# Patient Record
Sex: FEMALE | Race: BLACK OR AFRICAN AMERICAN | NOT HISPANIC OR LATINO | Employment: OTHER | URBAN - METROPOLITAN AREA
[De-identification: names, ages, dates, MRNs, and addresses within clinical notes are randomized per-mention and may not be internally consistent; named-entity substitution may affect disease eponyms.]

---

## 2023-05-02 ENCOUNTER — OFFICE VISIT (OUTPATIENT)
Dept: PRIMARY CARE | Facility: CLINIC | Age: 83
End: 2023-05-02
Payer: COMMERCIAL

## 2023-05-02 VITALS
HEIGHT: 65 IN | SYSTOLIC BLOOD PRESSURE: 120 MMHG | HEART RATE: 69 BPM | BODY MASS INDEX: 28.79 KG/M2 | WEIGHT: 172.8 LBS | OXYGEN SATURATION: 100 % | DIASTOLIC BLOOD PRESSURE: 79 MMHG

## 2023-05-02 DIAGNOSIS — H61.22 IMPACTED CERUMEN, LEFT EAR: ICD-10-CM

## 2023-05-02 DIAGNOSIS — R93.3 ABNORMAL COLONOSCOPY: Primary | ICD-10-CM

## 2023-05-02 PROCEDURE — 1036F TOBACCO NON-USER: CPT | Performed by: STUDENT IN AN ORGANIZED HEALTH CARE EDUCATION/TRAINING PROGRAM

## 2023-05-02 PROCEDURE — 99214 OFFICE O/P EST MOD 30 MIN: CPT | Performed by: STUDENT IN AN ORGANIZED HEALTH CARE EDUCATION/TRAINING PROGRAM

## 2023-05-02 PROCEDURE — 1159F MED LIST DOCD IN RCRD: CPT | Performed by: STUDENT IN AN ORGANIZED HEALTH CARE EDUCATION/TRAINING PROGRAM

## 2023-05-02 RX ORDER — METOPROLOL SUCCINATE 100 MG/1
100 TABLET, EXTENDED RELEASE ORAL DAILY
COMMUNITY

## 2023-05-02 RX ORDER — CHOLECALCIFEROL (VITAMIN D3) 25 MCG
1000 TABLET ORAL
COMMUNITY
Start: 2015-02-11

## 2023-05-02 RX ORDER — AMLODIPINE BESYLATE 5 MG/1
5 TABLET ORAL DAILY
COMMUNITY
End: 2023-10-16

## 2023-05-02 RX ORDER — IBUPROFEN 100 MG/5ML
1000 SUSPENSION, ORAL (FINAL DOSE FORM) ORAL
COMMUNITY

## 2023-05-02 NOTE — PROGRESS NOTES
"Subjective   Patient ID: Chio Lopez is a 82 y.o. female who presents for colonoscopy concerns (Pt had problems previously, wants to check again to make sure all is well.) and disability placard (Pr requests another one, she misplaced the previous one. ).  HPI  Patient here for colonoscopy order as it was recommended she has a repeat colonoscopy in 3 years when she had her last one performed in 2019 due to the polyps detected.    Also needs Rx for disability placard.    Denies new onset headaches, fever, chills, n/v/d, chest pain, SOB, abdominal pain, urinary symptoms, and lower extremity edema.     Review of Systems  All other systems have been reviewed and are negative.    Visit Vitals  /79   Pulse 69   Ht 1.651 m (5' 5\")   Wt 78.4 kg (172 lb 12.8 oz)   SpO2 100%   BMI 28.76 kg/m²   Smoking Status Never   BSA 1.9 m²       Objective   Physical Exam  General: Alert and oriented. Appears well-nourished and in no acute distress.  Eyes: PERRLA. EOMI.  Ears:  The pinna, tragus, and ear canal are non-tender and without swelling. The ear canal is clear without discharge. The tympanic membrane is normal in appearance with a good cone of light. Left impacted cerumen.  Head/neck: Normocephalic. Supple.  Lymphatics: No cervical lymphadenopathy.  Respiratory/Thorax: Clear to auscultation bilaterally. No wheezing.   Cardiovascular: Regular rate and rhythm. No murmurs.  Gastrointestinal: Soft, nontender, nondistended. +BS   Musculoskeletal: ROM intact. No joint swelling. Normal strength   Extremities: Warm and well perfused. No peripheral edema.  Neurological: No gross neurologic deficits.   Psychological: Appropriate mood and affect.   Skin: No visible rashes or lesions.     Assessment/Plan   Abnormal colonoscopy: colonoscopy order placed. Will follow up with results.  Arthritis of both knees: Disability placard provided for patient. Printed requisition for patient.  Left impacted cerumen: Rx Debrox sent to " pharmacy. Encouraged patient to return to clinic for irrigation, if necessary      No red flags. Follow up after her colonoscopy to discuss results.    Problem List Items Addressed This Visit    None      I have personally reviewed all available pertinent labs, imaging, and consult notes with the patient.     All questions and concerns were addressed. Patient verbalizes understanding instructions and agrees with established plan of care.     Leo Ugalde MD, MS  Family Medicine  [facility]

## 2023-09-18 PROBLEM — E78.5 HYPERLIPIDEMIA: Status: ACTIVE | Noted: 2023-09-18

## 2023-09-18 PROBLEM — Z86.010 HISTORY OF COLON POLYPS: Status: ACTIVE | Noted: 2023-09-18

## 2023-09-18 PROBLEM — R00.2 PALPITATIONS: Status: ACTIVE | Noted: 2023-09-18

## 2023-09-18 PROBLEM — I65.23 ATHEROSCLEROSIS OF BOTH CAROTID ARTERIES: Status: ACTIVE | Noted: 2023-09-18

## 2023-09-18 PROBLEM — E55.9 VITAMIN D DEFICIENCY: Status: ACTIVE | Noted: 2023-09-18

## 2023-09-18 PROBLEM — R29.898 WEAKNESS OF RIGHT LOWER EXTREMITY: Status: ACTIVE | Noted: 2023-09-18

## 2023-09-18 PROBLEM — R79.89 ABNORMAL CBC MEASUREMENT: Status: ACTIVE | Noted: 2023-09-18

## 2023-09-18 PROBLEM — R25.2 LEG CRAMPS: Status: ACTIVE | Noted: 2023-09-18

## 2023-09-18 PROBLEM — Z86.0100 HISTORY OF COLON POLYPS: Status: ACTIVE | Noted: 2023-09-18

## 2023-09-18 PROBLEM — R29.898 IMPAIRED FLEXIBILITY OF LOWER EXTREMITY: Status: ACTIVE | Noted: 2023-09-18

## 2023-09-18 PROBLEM — I47.29 NSVT (NONSUSTAINED VENTRICULAR TACHYCARDIA) (MULTI): Status: ACTIVE | Noted: 2023-09-18

## 2023-09-18 PROBLEM — K59.09 CHRONIC CONSTIPATION: Status: ACTIVE | Noted: 2023-09-18

## 2023-09-18 PROBLEM — I10 HYPERTENSION, GOAL BELOW 150/90: Status: ACTIVE | Noted: 2023-09-18

## 2023-09-18 PROBLEM — M17.0 OSTEOARTHRITIS OF KNEES, BILATERAL: Status: ACTIVE | Noted: 2023-09-18

## 2023-09-18 PROBLEM — I47.10 PAROXYSMAL SVT (SUPRAVENTRICULAR TACHYCARDIA) (CMS-HCC): Status: ACTIVE | Noted: 2023-09-18

## 2023-09-18 PROBLEM — R01.1 SYSTOLIC MURMUR: Status: ACTIVE | Noted: 2023-09-18

## 2023-09-21 ENCOUNTER — APPOINTMENT (OUTPATIENT)
Dept: PRIMARY CARE | Facility: CLINIC | Age: 83
End: 2023-09-21
Payer: COMMERCIAL

## 2023-11-17 ENCOUNTER — OFFICE VISIT (OUTPATIENT)
Dept: ORTHOPEDIC SURGERY | Facility: CLINIC | Age: 83
End: 2023-11-17
Payer: COMMERCIAL

## 2023-11-17 DIAGNOSIS — M17.0 PRIMARY OSTEOARTHRITIS OF BOTH KNEES: Primary | ICD-10-CM

## 2023-11-17 PROCEDURE — 20610 DRAIN/INJ JOINT/BURSA W/O US: CPT | Performed by: PHYSICIAN ASSISTANT

## 2023-11-17 PROCEDURE — 1036F TOBACCO NON-USER: CPT | Performed by: PHYSICIAN ASSISTANT

## 2023-11-17 PROCEDURE — 2500000004 HC RX 250 GENERAL PHARMACY W/ HCPCS (ALT 636 FOR OP/ED): Performed by: PHYSICIAN ASSISTANT

## 2023-11-17 PROCEDURE — 99214 OFFICE O/P EST MOD 30 MIN: CPT | Performed by: PHYSICIAN ASSISTANT

## 2023-11-17 PROCEDURE — 1159F MED LIST DOCD IN RCRD: CPT | Performed by: PHYSICIAN ASSISTANT

## 2023-11-17 PROCEDURE — 2500000005 HC RX 250 GENERAL PHARMACY W/O HCPCS: Performed by: PHYSICIAN ASSISTANT

## 2023-11-17 RX ORDER — LIDOCAINE HYDROCHLORIDE 10 MG/ML
4 INJECTION INFILTRATION; PERINEURAL
Status: COMPLETED | OUTPATIENT
Start: 2023-11-17 | End: 2023-11-17

## 2023-11-17 RX ORDER — TRIAMCINOLONE ACETONIDE 40 MG/ML
1 INJECTION, SUSPENSION INTRA-ARTICULAR; INTRAMUSCULAR
Status: COMPLETED | OUTPATIENT
Start: 2023-11-17 | End: 2023-11-17

## 2023-11-17 RX ADMIN — LIDOCAINE HYDROCHLORIDE 4 ML: 10 INJECTION, SOLUTION INFILTRATION; PERINEURAL at 09:42

## 2023-11-17 RX ADMIN — TRIAMCINOLONE ACETONIDE 1 ML: 400 INJECTION, SUSPENSION INTRA-ARTICULAR; INTRAMUSCULAR at 09:42

## 2023-11-17 NOTE — PROGRESS NOTES
GONZALO Jarquin, PAReinaC, ATC  Adult Reconstruction and Joint Replacement Surgery  Phone: [phone]     Fax:[phone]            No chief complaint on file.      SHAN Lopez is a pleasant 83 y.o. year-old female here for follow-up of known underlying Bilateral knee osteoarthritis. He/She presents for repeat evaluation.  The patient notes persistent pain as well as some occasional mechanical symptoms.  Pain level: 6. He/She is getting about 3 monthsof relief out of her injections.   The Pt has elected to undergo injection/s today.    The patient has tried  or is continuing the following modalities Rest, ice, elevation and Injections with some relief.    Past Medical History:   Diagnosis Date    Abnormal findings on diagnostic imaging of other parts of musculoskeletal system 05/01/2018    Abnormal bone density screening    Benign neoplasm of colon, unspecified     Colon adenoma    Diverticulosis of large intestine without perforation or abscess without bleeding     Diverticulosis, sigmoid    Elevated blood-pressure reading, without diagnosis of hypertension 10/24/2019    Finding of above normal blood pressure    Encounter for screening for malignant neoplasm of cervix 08/13/2020    Screening for cervical cancer    Encounter for screening for respiratory tuberculosis 05/01/2018    Screening-pulmonary TB    Encounter for screening, unspecified 08/13/2020    Screening for condition    Other chondrocalcinosis, left knee     Chondrocalcinosis of left knee    Other chondrocalcinosis, right knee     Chondrocalcinosis of right knee    Other long term (current) drug therapy     On statin therapy    Personal history of malignant neoplasm of breast     History of malignant neoplasm of female breast    Radiculopathy, thoracic region     Thoracic and lumbosacral neuritis     Patient Active Problem List   Diagnosis    Abnormal CBC measurement    Atherosclerosis of both carotid arteries    Benign  neoplasm of colon    Chronic constipation    History of colon polyps    Hyperlipidemia    Hypertension, goal below 150/90    Impaired flexibility of lower extremity    Intermittent lightheadedness    Leg cramps    Low back pain    NSVT (nonsustained ventricular tachycardia) (CMS/HCC)    Osteoarthritis of knees, bilateral    Pain in limb    Palpitations    Paroxysmal SVT (supraventricular tachycardia)    Personal history of malignant neoplasm of breast    Phlebitis    Pure hypercholesterolemia    Right calf pain    Systolic murmur    Weakness of right lower extremity    Vitamin D deficiency       Medication Documentation Review Audit       Reviewed by Ynes Patel MA (Medical Assistant) on 05/02/23 at 1356      Medication Order Taking? Sig Documenting Provider Last Dose Status   amLODIPine (Norvasc) 5 mg tablet [id number]  Take 1 tablet (5 mg) by mouth once daily. for blood pressure Historical Provider, MD  Active   ascorbic acid (Vitamin C) 1,000 mg tablet [id number]  Take 1 tablet (1,000 mg) by mouth once daily. Historical Provider, MD  Active   cholecalciferol (Vitamin D-3) 25 MCG (1000 UT) tablet [id number] Yes Take 1 tablet (1,000 Units) by mouth once daily. Historical Provider, MD  Active   metoprolol succinate XL (Toprol-XL) 100 mg 24 hr tablet [id number]  Take 1 tablet (100 mg) by mouth once daily. Historical Provider, MD  Active                    Allergies   Allergen Reactions    Penicillins Rash       Social History     Socioeconomic History    Marital status:      Spouse name: Not on file    Number of children: Not on file    Years of education: Not on file    Highest education level: Not on file   Occupational History    Not on file   Tobacco Use    Smoking status: Never    Smokeless tobacco: Never   Substance and Sexual Activity    Alcohol use: Yes     Comment: social    Drug use: Never    Sexual activity: Not on file   Other Topics Concern    Not on file   Social History Narrative    Not on  file     Social Determinants of Health     Financial Resource Strain: Not on file   Food Insecurity: Not on file   Transportation Needs: Not on file   Physical Activity: Not on file   Stress: Not on file   Social Connections: Not on file   Intimate Partner Violence: Not on file   Housing Stability: Not on file       No past surgical history on file.    Review of Systems    Physical Exam:  side: right Knee:  General: Well-appearing female in no acute distress.  Awake, alert and oriented.  Pleasant and cooperative.  Respiratory: Non-labored breathing  Mood: Euthymic   Gait: Antalgic  Assistive Device: no device  Skin: warm, dry and intact without lesions  Tenderness to palpation over anterior and medial, Joint line.  Effusion: mild  ROM Flexion/Extension: Unchanged  No instability varus or valgus stressing the knee at 0, 30 or 60 degrees.  No instability in the AP plane at 90 degrees.  5/5 hip flexion/knee extension/DF/PF/EHL  SILT in a candido/saph/per/tib distribution  Neuro L5-S1 sensation intact bilaterally, No clonus. 2+ symmetric patella and achilles reflexes bilateral.  2+ Femoral/DP/PT pulses bilaterally  Compartments supple and non-tender.  Extremities warm and well perfused.    There were no vitals filed for this visit.  There is no height or weight on file to calculate BMI.    Impression/Plan  Chio Lopez is getting adequate relief from injection therapy.     I discussed non-operative treatments for the patients' arthritis in detail and briefly discussed indications for surgery vs conservative treatment. The patient has elected to undergo knee side: bilateral injections today.  He understands he can repeat the injections every 3 to 4 months.  Hopefully gets the relief he is looking for.    Primary Osteoarthritis side: bilateral Knee    Procedure  L Inj/Asp: bilateral knee on 11/17/2023 9:42 AM  Indications: pain and joint swelling  Details: 22 G needle, anterolateral approach  Medications (Right): 1 mL  triamcinolone acetonide 40 mg/mL; 4 mL lidocaine 10 mg/mL (1 %)  Medications (Left): 1 mL triamcinolone acetonide 40 mg/mL; 4 mL lidocaine 10 mg/mL (1 %)    Discussion:  I discussed the conservative treatment options for knee osteoarthritis including but not limited to physical therapy, oral NSAIDS, activity and lifestyle modification, and corticosteroid injections. Pt has elected to undergo a cortisone injection today. I have explained the risk and benefits of an injection including the possibility of joint infection, bleeding, damage to cartilage, allergic reaction. Patient verbalized understanding and gave verbal consent wishes to proceed with a intra-articular cortisone injection for their knee.    Procedure:  After discussing the risk and benefits of the procedure, we proceeded with an intra-articular bilateral knee injection.    With the patient's informed verbal consent, the bilateral knees were prepped in standard sterile fashion with Chlorhexidine. The skin was then anesthetized with ethyl chloride spray and cleaned again with Chlorhexidine. The bilateral knees were then apirated/injected with a prefilled 20-gauge syringe of 40 mg Kenalog + 4 ml Lidocaine using the lateral approach without complications.  The patient tolerated this well and felt immediate initial relief of symptoms. A bandaid was applied and the patient ambulated out of the clinic on ther own accord without difficulty. Patient was instructed to avoid physical activity for 24-48 hours to prevent the knees from swelling and may ice the knees as tolerated. Patient should contact the office if any signs of of infection appear: redness, fever, chills, drainage, swelling or warmth to the knees.  Pt understands that the injections can be repeated no sooner than 3 months.      Procedure, treatment alternatives, risks and benefits explained, specific risks discussed. Consent was given by the patient. Immediately prior to procedure a time out was  called to verify the correct patient, procedure, equipment, support staff and site/side marked as required. Patient was prepped and draped in the usual sterile fashion.           All questions were answered.    Follow-up 3-4 months/PRN    GONZALO Jarquin, PAReinaC, ATC  Orthopedic Physician Assisant  Adult Reconstruction and Total Joint Replacement  General Orthopedics  Department of Orthopaedic Surgery  [place] [id number]  [employer] messaging preferred

## 2023-11-29 ENCOUNTER — OFFICE VISIT (OUTPATIENT)
Dept: CARDIOLOGY | Facility: CLINIC | Age: 83
End: 2023-11-29
Payer: COMMERCIAL

## 2023-11-29 VITALS
OXYGEN SATURATION: 97 % | HEIGHT: 65 IN | DIASTOLIC BLOOD PRESSURE: 78 MMHG | WEIGHT: 164 LBS | SYSTOLIC BLOOD PRESSURE: 122 MMHG | BODY MASS INDEX: 27.32 KG/M2 | HEART RATE: 70 BPM

## 2023-11-29 DIAGNOSIS — I47.10 PAROXYSMAL SVT (SUPRAVENTRICULAR TACHYCARDIA) (CMS-HCC): ICD-10-CM

## 2023-11-29 DIAGNOSIS — I10 HYPERTENSION, GOAL BELOW 150/90: ICD-10-CM

## 2023-11-29 DIAGNOSIS — R01.1 SYSTOLIC MURMUR: ICD-10-CM

## 2023-11-29 DIAGNOSIS — R00.2 PALPITATIONS: ICD-10-CM

## 2023-11-29 DIAGNOSIS — E78.00 PURE HYPERCHOLESTEROLEMIA: ICD-10-CM

## 2023-11-29 DIAGNOSIS — I47.29 NSVT (NONSUSTAINED VENTRICULAR TACHYCARDIA) (MULTI): ICD-10-CM

## 2023-11-29 DIAGNOSIS — I65.23 ATHEROSCLEROSIS OF BOTH CAROTID ARTERIES: Primary | ICD-10-CM

## 2023-11-29 PROCEDURE — 3078F DIAST BP <80 MM HG: CPT | Performed by: STUDENT IN AN ORGANIZED HEALTH CARE EDUCATION/TRAINING PROGRAM

## 2023-11-29 PROCEDURE — 1036F TOBACCO NON-USER: CPT | Performed by: STUDENT IN AN ORGANIZED HEALTH CARE EDUCATION/TRAINING PROGRAM

## 2023-11-29 PROCEDURE — 3074F SYST BP LT 130 MM HG: CPT | Performed by: STUDENT IN AN ORGANIZED HEALTH CARE EDUCATION/TRAINING PROGRAM

## 2023-11-29 PROCEDURE — 1159F MED LIST DOCD IN RCRD: CPT | Performed by: STUDENT IN AN ORGANIZED HEALTH CARE EDUCATION/TRAINING PROGRAM

## 2023-11-29 PROCEDURE — 99214 OFFICE O/P EST MOD 30 MIN: CPT | Performed by: STUDENT IN AN ORGANIZED HEALTH CARE EDUCATION/TRAINING PROGRAM

## 2023-11-29 RX ORDER — PRAVASTATIN SODIUM 40 MG/1
40 TABLET ORAL NIGHTLY
COMMUNITY
Start: 2023-11-27

## 2023-11-29 ASSESSMENT — ENCOUNTER SYMPTOMS
DEPRESSION: 0
OCCASIONAL FEELINGS OF UNSTEADINESS: 0
LOSS OF SENSATION IN FEET: 0

## 2023-11-29 NOTE — PROGRESS NOTES
Follow-up (1 year follow up)     HPI:    Chio Lopez is a 83 y.o. female with pertinent history ofhyperlipidemia, hypertension, palpitations with event monitor performed January 2021 revealing 362 episodes of supraventricular tachycardia lasting up to 33 beats and 12 episodes of nonsustained ventricular tachycardia lasting up to 28 beats, improvement on beta-blocker therapy, no clear ischemia nuclear stress test performed 3/1/2021, coronary calcium score of 4.8 performed 2/2/2021, preserved ejection fraction with impaired relaxation diastolic dysfunction mild to moderate tricuspid regurgitation and echo performed 3/1/2021 presents to cardiology clinic for follow-up.      Clinically she is doing well.  No significant chest discomfort.  Dyspnea on exertion is stable.  Reviewed and discussed her prior echo and stress test results.  Blood pressure has been well-controlled.  No exacerbating or relieving factors.  Patient denies chest pain and angina.  Pt denies orthopnea, and paroxysmal nocturnal dyspnea.  Pt denies worsening lower extremity edema.  Pt denies palpitations or syncope.  No recent falls.  No fever or chills.  No cough.  No change in bowel or bladder habits.  No sick contacts.  No recent travel.    12 point review of systems including (Constitutional, Eyes, ENMT, Respiratory, Cardiac, Gastrointestinal, Neurological, Psychiatric, and Hematologic) was performed and is otherwise negative.    Past medical history reviewed:   has a past medical history of Abnormal findings on diagnostic imaging of other parts of musculoskeletal system (05/01/2018), Benign neoplasm of colon, unspecified, Diverticulosis of large intestine without perforation or abscess without bleeding, Elevated blood-pressure reading, without diagnosis of hypertension (10/24/2019), Encounter for screening for malignant neoplasm of cervix (08/13/2020), Encounter for screening for respiratory tuberculosis (05/01/2018), Encounter for screening,  unspecified (08/13/2020), Other chondrocalcinosis, left knee, Other chondrocalcinosis, right knee, Other long term (current) drug therapy, Personal history of malignant neoplasm of breast, and Radiculopathy, thoracic region.    Past surgical history reviewed:   has no past surgical history on file.    Social history reviewed:   reports that she has never smoked. She has never used smokeless tobacco. She reports current alcohol use. She reports that she does not use drugs.     Family history reviewed:    Family History   Problem Relation Name Age of Onset    Diabetes Father      Other (cardiac disorder) Father         Allergies reviewed: Penicillins     Medications reviewed:   Current Outpatient Medications   Medication Instructions    amLODIPine (NORVASC) 5 mg, oral, Daily, for blood pressure    ascorbic acid (VITAMIN C) 1,000 mg, oral, Daily RT    cholecalciferol (VITAMIN D-3) 1,000 Units, oral, Daily RT    metoprolol succinate XL (TOPROL-XL) 100 mg, oral, Daily    pravastatin (PRAVACHOL) 40 mg, oral, Nightly        Vitals reviewed: Visit Vitals  /78   Pulse 70       Physical Exam:   General:  Patient is awake, alert, and oriented.  Patient is in no acute distress.  HEENT:  Pupils equal and reactive.  Normocephalic.  Moist mucosa.    Neck:  No thyromegaly.  Normal Jugular Venous Pressure.  Cardiovascular:  Regular rate and rhythm.  Normal S1 and S2.  1/6 FUAD.  Pulmonary:  Clear to auscultation bilaterally.  Abdomen:  Soft. Non-tender.   Non-distended.  Positive bowel sounds.  Lower Extremities:  2+ pedal pulses. No LE edema.  Neurologic:  Cranial nerves intact.  No focal deficit.   Skin: Skin warm and dry, normal skin turgor.   Psychiatric: Normal affect.    Last Labs:  CBC -      Lab Results   Component Value Date    WBC 3.8 (L) 09/22/2022    HGB 13.0 09/22/2022    HCT 37.6 09/22/2022     09/22/2022        CMP-  Lab Results   Component Value Date    GLUCOSE 112 (H) 09/22/2022     09/22/2022    K  4.4 09/22/2022     09/22/2022    CO2 32 09/22/2022    ANIONGAP 10 09/22/2022    BUN 16 09/22/2022    CREATININE 0.69 09/22/2022    CALCIUM 10.2 09/22/2022    PROT 7.3 09/22/2022    ALBUMIN 4.3 09/22/2022    AST 18 09/22/2022    ALT 9 09/22/2022    ALKPHOS 48 09/22/2022    BILITOT 0.4 09/22/2022        LIPIDS-  Lab Results   Component Value Date    CHOL 228 (H) 09/22/2022    TRIG 65 09/22/2022    HDL 76.5 09/22/2022    CHHDL 3.0 09/22/2022    VLDL 13 09/22/2022        OTHERS-  Lab Results   Component Value Date    HGBA1C 5.3 09/22/2022        I personally reviewed the patient's recent vitals, labs, medications, orders, EKGs, pertinent cardiac imaging/ echocardiography and ischemic evaluations including stress testing.    Assessment and Plan:    Chio Lopez is a 83 y.o. female with pertinent history ofhyperlipidemia, hypertension, palpitations with event monitor performed January 2021 revealing 362 episodes of supraventricular tachycardia lasting up to 33 beats and 12 episodes of nonsustained ventricular tachycardia lasting up to 28 beats, improvement on beta-blocker therapy, no clear ischemia nuclear stress test performed 3/1/2021, coronary calcium score of 4.8 performed 2/2/2021, preserved ejection fraction with impaired relaxation diastolic dysfunction mild to moderate tricuspid regurgitation and echo performed 3/1/2021 presents to cardiology clinic for follow-up.  Clinically she is doing well.  No significant chest discomfort.  Dyspnea on exertion is stable.  Reviewed and discussed her prior echo and stress test results.  Blood pressure has been well-controlled.     Continue amlodipine 5 mg daily, pravastatin 40 mg daily, metoprolol succinate 100 mg daily.     We will obtain a transthoracic echocardiogram for structural evaluation including ejection fraction, assessment of regional wall motion abnormalities or valvular disease, and further evaluation of hemodynamics.    Please followup with me in  Cardiology clinic within the next 1 year.  Please return to clinic sooner or seek emergent care if your symptoms reoccur or worsen.    Thank you for allowing me to participate in their care.  Please feel free to call me with any further questions or concerns.        Thony Elizabeth MD, FAC, [facility]ANDT  Division of Cardiovascular Medicine  Medical Director, [facility]  [facility]  Assistant Clinical Professor, Medicine  [employer]  [email or web address]  Office:  [phone]

## 2023-11-29 NOTE — PATIENT INSTRUCTIONS
Continue amlodipine 5 mg daily, pravastatin 40 mg daily, metoprolol succinate 100 mg daily.     We will obtain a transthoracic echocardiogram for structural evaluation including ejection fraction, assessment of regional wall motion abnormalities or valvular disease, and further evaluation of hemodynamics.    Please followup with me in Cardiology clinic within the next 1 year.  Please return to clinic sooner or seek emergent care if your symptoms reoccur or worsen.

## 2024-02-16 ENCOUNTER — OFFICE VISIT (OUTPATIENT)
Dept: ORTHOPEDIC SURGERY | Facility: CLINIC | Age: 84
End: 2024-02-16
Payer: COMMERCIAL

## 2024-02-16 VITALS — BODY MASS INDEX: 27.32 KG/M2 | HEIGHT: 65 IN | WEIGHT: 164 LBS

## 2024-02-16 DIAGNOSIS — M17.0 PRIMARY OSTEOARTHRITIS OF BOTH KNEES: Primary | ICD-10-CM

## 2024-02-16 PROCEDURE — 1125F AMNT PAIN NOTED PAIN PRSNT: CPT | Performed by: PHYSICIAN ASSISTANT

## 2024-02-16 PROCEDURE — 2500000004 HC RX 250 GENERAL PHARMACY W/ HCPCS (ALT 636 FOR OP/ED): Performed by: PHYSICIAN ASSISTANT

## 2024-02-16 PROCEDURE — 99214 OFFICE O/P EST MOD 30 MIN: CPT | Performed by: PHYSICIAN ASSISTANT

## 2024-02-16 PROCEDURE — 2500000005 HC RX 250 GENERAL PHARMACY W/O HCPCS: Performed by: PHYSICIAN ASSISTANT

## 2024-02-16 PROCEDURE — 20610 DRAIN/INJ JOINT/BURSA W/O US: CPT | Performed by: PHYSICIAN ASSISTANT

## 2024-02-16 PROCEDURE — 1159F MED LIST DOCD IN RCRD: CPT | Performed by: PHYSICIAN ASSISTANT

## 2024-02-16 PROCEDURE — 1036F TOBACCO NON-USER: CPT | Performed by: PHYSICIAN ASSISTANT

## 2024-02-16 RX ORDER — TRIAMCINOLONE ACETONIDE 40 MG/ML
1 INJECTION, SUSPENSION INTRA-ARTICULAR; INTRAMUSCULAR
Status: COMPLETED | OUTPATIENT
Start: 2024-02-16 | End: 2024-02-16

## 2024-02-16 RX ORDER — LIDOCAINE HYDROCHLORIDE 10 MG/ML
4 INJECTION INFILTRATION; PERINEURAL
Status: COMPLETED | OUTPATIENT
Start: 2024-02-16 | End: 2024-02-16

## 2024-02-16 RX ADMIN — LIDOCAINE HYDROCHLORIDE 4 ML: 10 INJECTION, SOLUTION INFILTRATION; PERINEURAL at 09:26

## 2024-02-16 RX ADMIN — TRIAMCINOLONE ACETONIDE 1 ML: 40 INJECTION, SUSPENSION INTRA-ARTICULAR; INTRAMUSCULAR at 09:26

## 2024-02-16 ASSESSMENT — PAIN DESCRIPTION - DESCRIPTORS: DESCRIPTORS: NAGGING

## 2024-02-16 ASSESSMENT — PAIN SCALES - GENERAL: PAINLEVEL_OUTOF10: 8

## 2024-02-16 ASSESSMENT — PAIN - FUNCTIONAL ASSESSMENT: PAIN_FUNCTIONAL_ASSESSMENT: 0-10

## 2024-02-16 NOTE — PROGRESS NOTES
Rohini Dubois, GONZALO, PAReinaC, ATC  Adult Reconstruction and Joint Replacement Surgery  Phone: [phone]     Fax:[phone]            Chief Complaint   Patient presents with    Left Knee - Injection Follow-up     Bilateral knee injections       HPI  Chio Lopez is a pleasant 83 y.o. year-old female here for follow-up of known underlying side: bilateral knee osteoarthritis. He/She presents for repeat evaluation.  The patient notes persistent pain as well as some occasional mechanical symptoms.  Pain level: 7. He/She is getting about 3 monthsof relief out of her injections.   The Pt has elected to undergo injection/s today.    The patient has tried  or is continuing the following modalities Injections with some relief.    Past Medical History:   Diagnosis Date    Abnormal findings on diagnostic imaging of other parts of musculoskeletal system 05/01/2018    Abnormal bone density screening    Benign neoplasm of colon, unspecified     Colon adenoma    Diverticulosis of large intestine without perforation or abscess without bleeding     Diverticulosis, sigmoid    Elevated blood-pressure reading, without diagnosis of hypertension 10/24/2019    Finding of above normal blood pressure    Encounter for screening for malignant neoplasm of cervix 08/13/2020    Screening for cervical cancer    Encounter for screening for respiratory tuberculosis 05/01/2018    Screening-pulmonary TB    Encounter for screening, unspecified 08/13/2020    Screening for condition    Other chondrocalcinosis, left knee     Chondrocalcinosis of left knee    Other chondrocalcinosis, right knee     Chondrocalcinosis of right knee    Other long term (current) drug therapy     On statin therapy    Personal history of malignant neoplasm of breast     History of malignant neoplasm of female breast    Radiculopathy, thoracic region     Thoracic and lumbosacral neuritis     Patient Active Problem List   Diagnosis    Abnormal CBC measurement     Atherosclerosis of both carotid arteries    Benign neoplasm of colon    Chronic constipation    History of colon polyps    Hyperlipidemia    Hypertension, goal below 150/90    Impaired flexibility of lower extremity    Intermittent lightheadedness    Leg cramps    Low back pain    NSVT (nonsustained ventricular tachycardia) (CMS/HCC)    Osteoarthritis of knees, bilateral    Pain in limb    Palpitations    Paroxysmal SVT (supraventricular tachycardia)    Personal history of malignant neoplasm of breast    Phlebitis    Pure hypercholesterolemia    Right calf pain    Systolic murmur    Weakness of right lower extremity    Vitamin D deficiency       Medication Documentation Review Audit       Reviewed by Lisa Craven MA (Medical Assistant) on 02/16/24 at 0909      Medication Order Taking? Sig Documenting Provider Last Dose Status   amLODIPine (Norvasc) 5 mg tablet [id number] No TAKE 1 TABLET BY MOUTH DAILY FOR BLOOD PRESSURE Daysi Harding, APRN-CNP Taking Active   ascorbic acid (Vitamin C) 1,000 mg tablet [id number] No Take 1 tablet (1,000 mg) by mouth once daily. Historical Provider, MD Taking Active   cholecalciferol (Vitamin D-3) 25 MCG (1000 UT) tablet [id number] No Take 1 tablet (1,000 Units) by mouth once daily. Historical Provider, MD Taking Active   metoprolol succinate XL (Toprol-XL) 100 mg 24 hr tablet [id number] No Take 1 tablet (100 mg) by mouth once daily. Historical Provider, MD Taking Active   pravastatin (Pravachol) 40 mg tablet [id number] No Take 1 tablet (40 mg) by mouth once daily at bedtime. Historical Provider, MD Taking Active                    Allergies   Allergen Reactions    Penicillins Rash       Social History     Socioeconomic History    Marital status:      Spouse name: Not on file    Number of children: Not on file    Years of education: Not on file    Highest education level: Not on file   Occupational History    Not on file   Tobacco Use    Smoking status: Never    Smokeless tobacco:  Never   Substance and Sexual Activity    Alcohol use: Yes     Comment: social    Drug use: Never    Sexual activity: Not on file   Other Topics Concern    Not on file   Social History Narrative    Not on file     Social Determinants of Health     Financial Resource Strain: Not on file   Food Insecurity: Not on file   Transportation Needs: Not on file   Physical Activity: Not on file   Stress: Not on file   Social Connections: Not on file   Intimate Partner Violence: Not on file   Housing Stability: Not on file       No past surgical history on file.    Review of Systems    Physical Exam:  side: bilateral Knee:  General: Well-appearing female in no acute distress.  Awake, alert and oriented.  Pleasant and cooperative.  Respiratory: Non-labored breathing  Mood: Euthymic   Gait: Antalgic  Assistive Device: no device  Skin: warm, dry and intact without lesions  Tenderness to palpation over anterior and medial, Joint line.  Effusion: mild  ROM Flexion/Extension: Unchanged  No instability varus or valgus stressing the knee at 0, 30 or 60 degrees.  No instability in the AP plane at 90 degrees.  5/5 hip flexion/knee extension/DF/PF/EHL  SILT in a candido/saph/per/tib distribution  Neuro L5-S1 sensation intact bilaterally, No clonus. 2+ symmetric patella and achilles reflexes bilateral.  2+ Femoral/DP/PT pulses bilaterally  Compartments supple and non-tender.  Extremities warm and well perfused.    There were no vitals filed for this visit.  Body mass index is 27.29 kg/m².    Impression/Plan  Chio Lopez is getting adequate relief from injection therapy.     I discussed non-operative treatments for the patients' arthritis in detail and briefly discussed indications for surgery vs conservative treatment. The patient has elected to undergo knee side: bilateral injections today.  He understands he can repeat the injections every 3 to 4 months.  Hopefully gets the relief he is looking for.    Primary Osteoarthritis side:  bilateral Knee    Procedure  L Inj/Asp: bilateral knee on 2/16/2024 9:26 AM  Indications: pain and joint swelling  Details: 22 G needle, anterolateral approach  Medications (Right): 1 mL triamcinolone acetonide 40 mg/mL; 4 mL lidocaine 10 mg/mL (1 %)  Medications (Left): 1 mL triamcinolone acetonide 40 mg/mL; 4 mL lidocaine 10 mg/mL (1 %)    Discussion:  I discussed the conservative treatment options for knee osteoarthritis including but not limited to physical therapy, oral NSAIDS, activity and lifestyle modification, and corticosteroid injections. Pt has elected to undergo a cortisone injection today. I have explained the risk and benefits of an injection including the possibility of joint infection, bleeding, damage to cartilage, allergic reaction. Patient verbalized understanding and gave verbal consent wishes to proceed with a intra-articular cortisone injection for their knee.    Procedure:  After discussing the risk and benefits of the procedure, we proceeded with an intra-articular bilateral knee injection.    With the patient's informed verbal consent, the bilateral knees were prepped in standard sterile fashion with Chlorhexidine. The skin was then anesthetized with ethyl chloride spray and cleaned again with Chlorhexidine. The bilateral knees were then apirated/injected with a prefilled 20-gauge syringe of 40 mg Kenalog + 4 ml Lidocaine using the lateral approach without complications.  The patient tolerated this well and felt immediate initial relief of symptoms. A bandaid was applied and the patient ambulated out of the clinic on ther own accord without difficulty. Patient was instructed to avoid physical activity for 24-48 hours to prevent the knees from swelling and may ice the knees as tolerated. Patient should contact the office if any signs of of infection appear: redness, fever, chills, drainage, swelling or warmth to the knees.  Pt understands that the injections can be repeated no sooner than 3  months.      Procedure, treatment alternatives, risks and benefits explained, specific risks discussed. Consent was given by the patient. Immediately prior to procedure a time out was called to verify the correct patient, procedure, equipment, support staff and site/side marked as required. Patient was prepped and draped in the usual sterile fashion.           All questions were answered.    Follow-up 3-4 months/PRN    GONZALO Jarquin, PAReinaC, ATC  Orthopedic Physician Assisant  Adult Reconstruction and Total Joint Replacement  General Orthopedics  Department of Orthopaedic Surgery  [place] [id number]  [employer] messaging preferred

## 2024-05-17 ENCOUNTER — OFFICE VISIT (OUTPATIENT)
Dept: ORTHOPEDIC SURGERY | Facility: CLINIC | Age: 84
End: 2024-05-17
Payer: COMMERCIAL

## 2024-05-17 VITALS — HEIGHT: 65 IN | WEIGHT: 164 LBS | BODY MASS INDEX: 27.32 KG/M2

## 2024-05-17 DIAGNOSIS — M17.0 PRIMARY OSTEOARTHRITIS OF KNEES, BILATERAL: Primary | ICD-10-CM

## 2024-05-17 PROCEDURE — 20610 DRAIN/INJ JOINT/BURSA W/O US: CPT | Performed by: PHYSICIAN ASSISTANT

## 2024-05-17 PROCEDURE — 1159F MED LIST DOCD IN RCRD: CPT | Performed by: PHYSICIAN ASSISTANT

## 2024-05-17 PROCEDURE — 99214 OFFICE O/P EST MOD 30 MIN: CPT | Performed by: PHYSICIAN ASSISTANT

## 2024-05-17 PROCEDURE — 2500000004 HC RX 250 GENERAL PHARMACY W/ HCPCS (ALT 636 FOR OP/ED): Performed by: PHYSICIAN ASSISTANT

## 2024-05-17 PROCEDURE — 1036F TOBACCO NON-USER: CPT | Performed by: PHYSICIAN ASSISTANT

## 2024-05-17 PROCEDURE — 2500000005 HC RX 250 GENERAL PHARMACY W/O HCPCS: Performed by: PHYSICIAN ASSISTANT

## 2024-05-17 RX ORDER — LIDOCAINE HYDROCHLORIDE 10 MG/ML
4 INJECTION INFILTRATION; PERINEURAL
Status: COMPLETED | OUTPATIENT
Start: 2024-05-17 | End: 2024-05-17

## 2024-05-17 RX ORDER — TRIAMCINOLONE ACETONIDE 40 MG/ML
1 INJECTION, SUSPENSION INTRA-ARTICULAR; INTRAMUSCULAR
Status: COMPLETED | OUTPATIENT
Start: 2024-05-17 | End: 2024-05-17

## 2024-05-17 RX ADMIN — TRIAMCINOLONE ACETONIDE 1 ML: 400 INJECTION, SUSPENSION INTRA-ARTICULAR; INTRAMUSCULAR at 09:30

## 2024-05-17 RX ADMIN — LIDOCAINE HYDROCHLORIDE 4 ML: 10 INJECTION, SOLUTION INFILTRATION; PERINEURAL at 09:30

## 2024-05-17 ASSESSMENT — PAIN DESCRIPTION - DESCRIPTORS: DESCRIPTORS: ACHING;DISCOMFORT;DULL

## 2024-05-17 ASSESSMENT — PAIN - FUNCTIONAL ASSESSMENT: PAIN_FUNCTIONAL_ASSESSMENT: 0-10

## 2024-05-17 ASSESSMENT — PAIN SCALES - GENERAL: PAINLEVEL_OUTOF10: 5 - MODERATE PAIN

## 2024-05-17 NOTE — PROGRESS NOTES
GONZALO Jarquin, PAReinaC, ATC  Adult Reconstruction and Joint Replacement Surgery  Phone: [phone]     Fax:[phone]            Chief Complaint   Patient presents with    Left Knee - Pain    Right Knee - Pain       HPI  Chio Lopez is a pleasant 83 y.o. year-old female here for follow-up of known underlying side: bilateral knee osteoarthritis. He/She presents for repeat evaluation.  The patient notes persistent pain as well as some occasional mechanical symptoms.  Pain level: 5. He/She is getting about 3 monthsof relief out of her injections.   The Pt has elected to undergo injection/s today.    Past Medical History:   Diagnosis Date    Abnormal findings on diagnostic imaging of other parts of musculoskeletal system 05/01/2018    Abnormal bone density screening    Benign neoplasm of colon, unspecified     Colon adenoma    Diverticulosis of large intestine without perforation or abscess without bleeding     Diverticulosis, sigmoid    Elevated blood-pressure reading, without diagnosis of hypertension 10/24/2019    Finding of above normal blood pressure    Encounter for screening for malignant neoplasm of cervix 08/13/2020    Screening for cervical cancer    Encounter for screening for respiratory tuberculosis 05/01/2018    Screening-pulmonary TB    Encounter for screening, unspecified 08/13/2020    Screening for condition    Other chondrocalcinosis, left knee     Chondrocalcinosis of left knee    Other chondrocalcinosis, right knee     Chondrocalcinosis of right knee    Other long term (current) drug therapy     On statin therapy    Personal history of malignant neoplasm of breast     History of malignant neoplasm of female breast    Radiculopathy, thoracic region     Thoracic and lumbosacral neuritis     Patient Active Problem List   Diagnosis    Abnormal CBC measurement    Atherosclerosis of both carotid arteries    Benign neoplasm of colon    Chronic constipation    History of colon polyps     Hyperlipidemia    Hypertension, goal below 150/90    Impaired flexibility of lower extremity    Intermittent lightheadedness    Leg cramps    Low back pain    NSVT (nonsustained ventricular tachycardia) (Multi)    Osteoarthritis of knees, bilateral    Pain in limb    Palpitations    Paroxysmal SVT (supraventricular tachycardia) (CMS-HCC)    Personal history of malignant neoplasm of breast    Phlebitis    Pure hypercholesterolemia    Right calf pain    Systolic murmur    Weakness of right lower extremity    Vitamin D deficiency       Medication Documentation Review Audit       Reviewed by Daisy Mccarthy, PCNA (Patient Care Technician) on 05/17/24 at 0850      Medication Order Taking? Sig Documenting Provider Last Dose Status   amLODIPine (Norvasc) 5 mg tablet [id number] Yes TAKE 1 TABLET BY MOUTH DAILY FOR BLOOD PRESSURE Daysi Harding, APRN-CNP Taking Active   ascorbic acid (Vitamin C) 1,000 mg tablet [id number] Yes Take 1 tablet (1,000 mg) by mouth once daily. Historical Provider, MD Taking Active   cholecalciferol (Vitamin D-3) 25 MCG (1000 UT) tablet [id number] Yes Take 1 tablet (1,000 Units) by mouth once daily. Historical Provider, MD Taking Active   metoprolol succinate XL (Toprol-XL) 100 mg 24 hr tablet [id number] Yes Take 1 tablet (100 mg) by mouth once daily. Historical Provider, MD Taking Active   pravastatin (Pravachol) 40 mg tablet [id number] Yes Take 1 tablet (40 mg) by mouth once daily at bedtime. Historical Provider, MD Taking Active                    Allergies   Allergen Reactions    Penicillins Rash       Social History     Socioeconomic History    Marital status:      Spouse name: Not on file    Number of children: Not on file    Years of education: Not on file    Highest education level: Not on file   Occupational History    Not on file   Tobacco Use    Smoking status: Never    Smokeless tobacco: Never   Substance and Sexual Activity    Alcohol use: Yes     Comment: social    Drug use:  Never    Sexual activity: Not on file   Other Topics Concern    Not on file   Social History Narrative    Not on file     Social Determinants of Health     Financial Resource Strain: Not on file   Food Insecurity: Not on file   Transportation Needs: Not on file   Physical Activity: Not on file   Stress: Not on file   Social Connections: Not on file   Intimate Partner Violence: Not on file   Housing Stability: Not on file       History reviewed. No pertinent surgical history.    Review of Systems    Physical Exam:  side: bilateral Knee:  General: Well-appearing female in no acute distress.  Awake, alert and oriented.  Pleasant and cooperative.  Respiratory: Non-labored breathing  Mood: Euthymic   Gait: Antalgic  Assistive Device: cane  Skin: warm, dry and intact without lesions  Tenderness to palpation over anterior and medial, Joint line.  Effusion: mild  ROM Flexion/Extension: Unchanged  No instability varus or valgus stressing the knee at 0, 30 or 60 degrees.  No instability in the AP plane at 90 degrees.  5/5 hip flexion/knee extension/DF/PF/EHL  SILT in a candido/saph/per/tib distribution  Neuro L5-S1 sensation intact bilaterally, No clonus. 2+ symmetric patella and achilles reflexes bilateral.  2+ Femoral/DP/PT pulses bilaterally  Compartments supple and non-tender.  Extremities warm and well perfused.    There were no vitals filed for this visit.  Body mass index is 27.29 kg/m².    Impression/Plan  Chio Lopez is getting adequate relief from injection therapy.     I discussed non-operative treatments for the patients' arthritis in detail and briefly discussed indications for surgery vs conservative treatment. The patient has elected to undergo knee side: bilateral injections today.  He understands he can repeat the injections every 3 to 4 months.  Hopefully gets the relief he is looking for.    Primary Osteoarthritis side: bilateral Knee    Procedure  L Inj/Asp: bilateral knee on 5/17/2024 9:30  AM  Indications: pain and joint swelling  Details: 22 G needle, anterolateral approach  Medications (Right): 1 mL triamcinolone acetonide 40 mg/mL; 4 mL lidocaine 10 mg/mL (1 %)  Medications (Left): 1 mL triamcinolone acetonide 40 mg/mL; 4 mL lidocaine 10 mg/mL (1 %)    Discussion:  I discussed the conservative treatment options for knee osteoarthritis including but not limited to physical therapy, oral NSAIDS, activity and lifestyle modification, and corticosteroid injections. Pt has elected to undergo a cortisone injection today. I have explained the risk and benefits of an injection including the possibility of joint infection, bleeding, damage to cartilage, allergic reaction. Patient verbalized understanding and gave verbal consent wishes to proceed with a intra-articular cortisone injection for their knee.    Procedure:  After discussing the risk and benefits of the procedure, we proceeded with an intra-articular bilateral knee injection.    With the patient's informed verbal consent, the bilateral knees were prepped in standard sterile fashion with Chlorhexidine. The skin was then anesthetized with ethyl chloride spray and cleaned again with Chlorhexidine. The bilateral knees were then apirated/injected with a prefilled 20-gauge syringe of 40 mg Kenalog + 4 ml Lidocaine using the lateral approach without complications.  The patient tolerated this well and felt immediate initial relief of symptoms. A bandaid was applied and the patient ambulated out of the clinic on ther own accord without difficulty. Patient was instructed to avoid physical activity for 24-48 hours to prevent the knees from swelling and may ice the knees as tolerated. Patient should contact the office if any signs of of infection appear: redness, fever, chills, drainage, swelling or warmth to the knees.  Pt understands that the injections can be repeated no sooner than 3 months.      Procedure, treatment alternatives, risks and benefits  explained, specific risks discussed. Consent was given by the patient. Immediately prior to procedure a time out was called to verify the correct patient, procedure, equipment, support staff and site/side marked as required. Patient was prepped and draped in the usual sterile fashion.           All questions were answered.    Follow-up 3-4 months/PRN    GONZALO Jarquin, PAReinaC, ATC  Orthopedic Physician Assisant  Adult Reconstruction and Total Joint Replacement  General Orthopedics  Department of Orthopaedic Surgery  [place] [address]  [employer] messaging preferred

## 2024-08-16 ENCOUNTER — HOSPITAL ENCOUNTER (OUTPATIENT)
Dept: RADIOLOGY | Facility: CLINIC | Age: 84
Discharge: HOME | End: 2024-08-16
Payer: COMMERCIAL

## 2024-08-16 ENCOUNTER — APPOINTMENT (OUTPATIENT)
Dept: RADIOLOGY | Facility: CLINIC | Age: 84
End: 2024-08-16
Payer: COMMERCIAL

## 2024-08-16 ENCOUNTER — OFFICE VISIT (OUTPATIENT)
Dept: ORTHOPEDIC SURGERY | Facility: CLINIC | Age: 84
End: 2024-08-16
Payer: COMMERCIAL

## 2024-08-16 VITALS — WEIGHT: 162 LBS | BODY MASS INDEX: 26.96 KG/M2

## 2024-08-16 DIAGNOSIS — M25.562 ACUTE BILATERAL KNEE PAIN: ICD-10-CM

## 2024-08-16 DIAGNOSIS — M25.561 ACUTE BILATERAL KNEE PAIN: ICD-10-CM

## 2024-08-16 DIAGNOSIS — M15.9 PRIMARY OSTEOARTHRITIS INVOLVING MULTIPLE JOINTS: ICD-10-CM

## 2024-08-16 DIAGNOSIS — M17.0 BILATERAL PRIMARY OSTEOARTHRITIS OF KNEE: Primary | ICD-10-CM

## 2024-08-16 PROCEDURE — 2500000005 HC RX 250 GENERAL PHARMACY W/O HCPCS: Performed by: PHYSICIAN ASSISTANT

## 2024-08-16 PROCEDURE — 20610 DRAIN/INJ JOINT/BURSA W/O US: CPT | Mod: 50 | Performed by: PHYSICIAN ASSISTANT

## 2024-08-16 PROCEDURE — 1159F MED LIST DOCD IN RCRD: CPT | Performed by: PHYSICIAN ASSISTANT

## 2024-08-16 PROCEDURE — 1125F AMNT PAIN NOTED PAIN PRSNT: CPT | Performed by: PHYSICIAN ASSISTANT

## 2024-08-16 PROCEDURE — 73562 X-RAY EXAM OF KNEE 3: CPT | Mod: RT

## 2024-08-16 PROCEDURE — 2500000004 HC RX 250 GENERAL PHARMACY W/ HCPCS (ALT 636 FOR OP/ED): Performed by: PHYSICIAN ASSISTANT

## 2024-08-16 PROCEDURE — 99214 OFFICE O/P EST MOD 30 MIN: CPT | Performed by: PHYSICIAN ASSISTANT

## 2024-08-16 PROCEDURE — 73560 X-RAY EXAM OF KNEE 1 OR 2: CPT | Mod: LT

## 2024-08-16 RX ORDER — LIDOCAINE HYDROCHLORIDE 10 MG/ML
4 INJECTION INFILTRATION; PERINEURAL
Status: COMPLETED | OUTPATIENT
Start: 2024-08-16 | End: 2024-08-16

## 2024-08-16 RX ORDER — TRIAMCINOLONE ACETONIDE 40 MG/ML
1 INJECTION, SUSPENSION INTRA-ARTICULAR; INTRAMUSCULAR
Status: COMPLETED | OUTPATIENT
Start: 2024-08-16 | End: 2024-08-16

## 2024-08-16 ASSESSMENT — PAIN SCALES - GENERAL: PAINLEVEL: 7

## 2024-08-16 NOTE — PROGRESS NOTES
Rohini Dubois, GONZALO, PAReinaC, ATC  Adult Reconstruction and Joint Replacement Surgery  Phone: [phone]     Fax:[phone]            Chief Complaint   Patient presents with    Left Knee - Pain    Right Knee - Pain    Injection Follow-up       HPI  Chio Lopez is a pleasant 84 y.o. year-old female here for follow-up of known underlying side: bilateral knee osteoarthritis. He/She presents for repeat evaluation.  The patient notes persistent pain as well as some occasional mechanical symptoms.  Pain level: 4. He/She is getting about 2 monthsof relief out of her injections.   The Pt has elected to undergo injection/s today.  She has not had x-rays in 4 years.    Past Medical History:   Diagnosis Date    Abnormal findings on diagnostic imaging of other parts of musculoskeletal system 05/01/2018    Abnormal bone density screening    Benign neoplasm of colon, unspecified     Colon adenoma    Diverticulosis of large intestine without perforation or abscess without bleeding     Diverticulosis, sigmoid    Elevated blood-pressure reading, without diagnosis of hypertension 10/24/2019    Finding of above normal blood pressure    Encounter for screening for malignant neoplasm of cervix 08/13/2020    Screening for cervical cancer    Encounter for screening for respiratory tuberculosis 05/01/2018    Screening-pulmonary TB    Encounter for screening, unspecified 08/13/2020    Screening for condition    Other chondrocalcinosis, left knee     Chondrocalcinosis of left knee    Other chondrocalcinosis, right knee     Chondrocalcinosis of right knee    Other long term (current) drug therapy     On statin therapy    Personal history of malignant neoplasm of breast     History of malignant neoplasm of female breast    Radiculopathy, thoracic region     Thoracic and lumbosacral neuritis     Patient Active Problem List   Diagnosis    Abnormal CBC measurement    Atherosclerosis of both carotid arteries    Benign neoplasm  of colon    Chronic constipation    History of colon polyps    Hyperlipidemia    Hypertension, goal below 150/90    Impaired flexibility of lower extremity    Intermittent lightheadedness    Leg cramps    Low back pain    NSVT (nonsustained ventricular tachycardia) (Multi)    Osteoarthritis of knees, bilateral    Pain in limb    Palpitations    Paroxysmal SVT (supraventricular tachycardia) (CMS-HCC)    Personal history of malignant neoplasm of breast    Phlebitis    Pure hypercholesterolemia    Right calf pain    Systolic murmur    Weakness of right lower extremity    Vitamin D deficiency       Medication Documentation Review Audit       Reviewed by Ras Rivera PCNA (Patient Care Technician) on 08/16/24 at 0906      Medication Order Taking? Sig Documenting Provider Last Dose Status   amLODIPine (Norvasc) 5 mg tablet [id number] No TAKE 1 TABLET BY MOUTH DAILY FOR BLOOD PRESSURE Daysi Harding, APRN-CNP Taking Active   ascorbic acid (Vitamin C) 1,000 mg tablet [id number] No Take 1 tablet (1,000 mg) by mouth once daily. Historical Provider, MD Taking Active   cholecalciferol (Vitamin D-3) 25 MCG (1000 UT) tablet [id number] No Take 1 tablet (1,000 Units) by mouth once daily. Historical Provider, MD Taking Active   metoprolol succinate XL (Toprol-XL) 100 mg 24 hr tablet [id number] No Take 1 tablet (100 mg) by mouth once daily. Historical Provider, MD Taking Active   pravastatin (Pravachol) 40 mg tablet [id number] No Take 1 tablet (40 mg) by mouth once daily at bedtime. Historical Provider, MD Taking Active                    Allergies   Allergen Reactions    Penicillins Rash       Social History     Socioeconomic History    Marital status:      Spouse name: Not on file    Number of children: Not on file    Years of education: Not on file    Highest education level: Not on file   Occupational History    Not on file   Tobacco Use    Smoking status: Never    Smokeless tobacco: Never   Substance and Sexual Activity     Alcohol use: Yes     Comment: social    Drug use: Never    Sexual activity: Not on file   Other Topics Concern    Not on file   Social History Narrative    Not on file     Social Determinants of Health     Financial Resource Strain: Not on file   Food Insecurity: Not on file   Transportation Needs: Not on file   Physical Activity: Not on file   Stress: Not on file   Social Connections: Not on file   Intimate Partner Violence: Not on file   Housing Stability: Not on file       No past surgical history on file.    Review of Systems    Physical Exam:  side: bilateral Knee:  General: Well-appearing female in no acute distress.  Awake, alert and oriented.  Pleasant and cooperative.  Respiratory: Non-labored breathing  Mood: Euthymic   Gait: Antalgic  Assistive Device: no device  Skin: warm, dry and intact without lesions  Tenderness to palpation over anterior and medial, Joint line.  Effusion: mild  ROM Flexion/Extension: Unchanged  No instability varus or valgus stressing the knee at 0, 30 or 60 degrees.  No instability in the AP plane at 90 degrees.  5/5 hip flexion/knee extension/DF/PF/EHL  SILT in a candido/saph/per/tib distribution  Neuro L5-S1 sensation intact bilaterally, No clonus. 2+ symmetric patella and achilles reflexes bilateral.  2+ Femoral/DP/PT pulses bilaterally  Compartments supple and non-tender.  Extremities warm and well perfused.    There were no vitals filed for this visit.  Body mass index is 26.96 kg/m².    Impression/Plan  Chio Lopez is getting adequate relief from injection therapy.     I discussed non-operative treatments for the patients' arthritis in detail and briefly discussed indications for surgery vs conservative treatment. The patient has elected to undergo knee side: bilateral injections today.  He understands he can repeat the injections every 3 to 4 months.  Hopefully gets the relief he is looking for.    Will plan to get x-rays on her way out today.  I will call her with the  results.    Primary Osteoarthritis side: bilateral Knee    Procedure  L Inj/Asp: bilateral knee on 8/16/2024 9:20 AM  Indications: pain and joint swelling  Details: 22 G needle, anterolateral approach  Medications (Right): 1 mL triamcinolone acetonide 40 mg/mL; 4 mL lidocaine 10 mg/mL (1 %)  Medications (Left): 1 mL triamcinolone acetonide 40 mg/mL; 4 mL lidocaine 10 mg/mL (1 %)  Outcome: tolerated well, no immediate complications    Discussion:  I discussed the conservative treatment options for knee osteoarthritis including but not limited to physical therapy, oral NSAIDS, activity and lifestyle modification, and corticosteroid injections. Pt has elected to undergo a cortisone injection today. I have explained the risk and benefits of an injection including the possibility of joint infection, bleeding, damage to cartilage, allergic reaction. Patient verbalized understanding and gave verbal consent wishes to proceed with a intra-articular cortisone injection for their knee.    Procedure:  After discussing the risk and benefits of the procedure, we proceeded with an intra-articular bilateral knee injection.    With the patient's informed verbal consent, the bilateral knees were prepped in standard sterile fashion with Chlorhexidine. The skin was then anesthetized with ethyl chloride spray and cleaned again with Chlorhexidine. The bilateral knees were then apirated/injected with a prefilled 20-gauge syringe of 40 mg Kenalog + 4 ml Lidocaine using the lateral approach without complications.  The patient tolerated this well and felt immediate initial relief of symptoms. A bandaid was applied and the patient ambulated out of the clinic on ther own accord without difficulty. Patient was instructed to avoid physical activity for 24-48 hours to prevent the knees from swelling and may ice the knees as tolerated. Patient should contact the office if any signs of of infection appear: redness, fever, chills, drainage, swelling  or warmth to the knees.  Pt understands that the injections can be repeated no sooner than 3 months.      Procedure, treatment alternatives, risks and benefits explained, specific risks discussed. Consent was given by the patient. Immediately prior to procedure a time out was called to verify the correct patient, procedure, equipment, support staff and site/side marked as required. Patient was prepped and draped in the usual sterile fashion.           All questions were answered.    Follow-up 3-4 months/PRN    GONZALO Jarquin, PA-C, ATC  Orthopedic Physician Assisant  Adult Reconstruction and Total Joint Replacement  General Orthopedics  Department of Orthopaedic Surgery  [place] [address]  [employer] messaging preferred

## 2024-11-15 ENCOUNTER — ANCILLARY PROCEDURE (OUTPATIENT)
Dept: CARDIOLOGY | Facility: CLINIC | Age: 84
End: 2024-11-15
Payer: COMMERCIAL

## 2024-11-15 DIAGNOSIS — I65.23 ATHEROSCLEROSIS OF BOTH CAROTID ARTERIES: ICD-10-CM

## 2024-11-15 DIAGNOSIS — I10 HYPERTENSION, GOAL BELOW 150/90: ICD-10-CM

## 2024-11-15 DIAGNOSIS — E78.00 PURE HYPERCHOLESTEROLEMIA: ICD-10-CM

## 2024-11-15 DIAGNOSIS — I47.29 NSVT (NONSUSTAINED VENTRICULAR TACHYCARDIA) (MULTI): ICD-10-CM

## 2024-11-15 DIAGNOSIS — R01.1 SYSTOLIC MURMUR: ICD-10-CM

## 2024-11-15 DIAGNOSIS — R00.2 PALPITATIONS: ICD-10-CM

## 2024-11-15 DIAGNOSIS — I47.10 PAROXYSMAL SVT (SUPRAVENTRICULAR TACHYCARDIA) (CMS-HCC): ICD-10-CM

## 2024-11-15 LAB
AORTIC VALVE MEAN GRADIENT: 8 MMHG
AORTIC VALVE PEAK VELOCITY: 2.03 M/S
AV PEAK GRADIENT: 16 MMHG
AVA (PEAK VEL): 1.67 CM2
AVA (VTI): 1.84 CM2
EJECTION FRACTION APICAL 4 CHAMBER: 58.8
EJECTION FRACTION: 57 %
LEFT ATRIUM VOLUME AREA LENGTH INDEX BSA: 35 ML/M2
LEFT VENTRICLE INTERNAL DIMENSION DIASTOLE: 3.17 CM (ref 3.5–6)
LEFT VENTRICULAR OUTFLOW TRACT DIAMETER: 1.93 CM
MITRAL VALVE E/A RATIO: 0.9
RIGHT VENTRICLE FREE WALL PEAK S': 14 CM/S
RIGHT VENTRICLE PEAK SYSTOLIC PRESSURE: 33.9 MMHG
TRICUSPID ANNULAR PLANE SYSTOLIC EXCURSION: 2 CM

## 2024-11-15 PROCEDURE — 93306 TTE W/DOPPLER COMPLETE: CPT

## 2024-11-15 PROCEDURE — 93306 TTE W/DOPPLER COMPLETE: CPT | Performed by: STUDENT IN AN ORGANIZED HEALTH CARE EDUCATION/TRAINING PROGRAM

## 2024-11-21 ENCOUNTER — APPOINTMENT (OUTPATIENT)
Dept: ORTHOPEDIC SURGERY | Facility: CLINIC | Age: 84
End: 2024-11-21
Payer: COMMERCIAL

## 2024-11-21 DIAGNOSIS — M17.0 PRIMARY OSTEOARTHRITIS OF KNEES, BILATERAL: Primary | ICD-10-CM

## 2024-11-21 PROCEDURE — 99214 OFFICE O/P EST MOD 30 MIN: CPT | Performed by: PHYSICIAN ASSISTANT

## 2024-11-21 PROCEDURE — 2500000004 HC RX 250 GENERAL PHARMACY W/ HCPCS (ALT 636 FOR OP/ED): Performed by: PHYSICIAN ASSISTANT

## 2024-11-21 PROCEDURE — 20610 DRAIN/INJ JOINT/BURSA W/O US: CPT | Mod: 50 | Performed by: PHYSICIAN ASSISTANT

## 2024-11-21 RX ORDER — LIDOCAINE HYDROCHLORIDE 10 MG/ML
4 INJECTION, SOLUTION INFILTRATION; PERINEURAL
Status: COMPLETED | OUTPATIENT
Start: 2024-11-21 | End: 2024-11-21

## 2024-11-21 RX ORDER — TRIAMCINOLONE ACETONIDE 40 MG/ML
1 INJECTION, SUSPENSION INTRA-ARTICULAR; INTRAMUSCULAR
Status: COMPLETED | OUTPATIENT
Start: 2024-11-21 | End: 2024-11-21

## 2024-11-21 NOTE — PROGRESS NOTES
GONZALO Jarquin, PAReinaC, ATC  Adult Reconstruction and Joint Replacement Surgery  Phone: [phone]     Fax:[phone]            No chief complaint on file.      SHAN Lopez is a pleasant 84 y.o. year-old female here for follow-up of known underlying side: bilateral knee osteoarthritis. The patient presents for repeat evaluation.  The patient notes persistent pain as well as some occasional mechanical symptoms.  Pain level: 5. The patient is getting about 3 monthsof relief out of her injections.   The Pt has elected to undergo injection/s today.    Past Medical History:   Diagnosis Date    Abnormal findings on diagnostic imaging of other parts of musculoskeletal system 05/01/2018    Abnormal bone density screening    Benign neoplasm of colon, unspecified     Colon adenoma    Diverticulosis of large intestine without perforation or abscess without bleeding     Diverticulosis, sigmoid    Elevated blood-pressure reading, without diagnosis of hypertension 10/24/2019    Finding of above normal blood pressure    Encounter for screening for malignant neoplasm of cervix 08/13/2020    Screening for cervical cancer    Encounter for screening for respiratory tuberculosis 05/01/2018    Screening-pulmonary TB    Encounter for screening, unspecified 08/13/2020    Screening for condition    Other chondrocalcinosis, left knee     Chondrocalcinosis of left knee    Other chondrocalcinosis, right knee     Chondrocalcinosis of right knee    Other long term (current) drug therapy     On statin therapy    Personal history of malignant neoplasm of breast     History of malignant neoplasm of female breast    Radiculopathy, thoracic region     Thoracic and lumbosacral neuritis     Patient Active Problem List   Diagnosis    Abnormal CBC measurement    Atherosclerosis of both carotid arteries    Benign neoplasm of colon    Chronic constipation    History of colon polyps    Hyperlipidemia    Hypertension, goal  below 150/90    Impaired flexibility of lower extremity    Intermittent lightheadedness    Leg cramps    Low back pain    NSVT (nonsustained ventricular tachycardia) (Multi)    Osteoarthritis of knees, bilateral    Pain in limb    Palpitations    Paroxysmal SVT (supraventricular tachycardia) (CMS-HCC)    Personal history of malignant neoplasm of breast    Phlebitis    Pure hypercholesterolemia    Right calf pain    Systolic murmur    Weakness of right lower extremity    Vitamin D deficiency       Medication Documentation Review Audit       Reviewed by Ras Rivera, PCNA (Patient Care Technician) on 08/16/24 at 0906      Medication Order Taking? Sig Documenting Provider Last Dose Status   amLODIPine (Norvasc) 5 mg tablet [id number] No TAKE 1 TABLET BY MOUTH DAILY FOR BLOOD PRESSURE Daysi Harding, APRN-CNP Taking Active   ascorbic acid (Vitamin C) 1,000 mg tablet [id number] No Take 1 tablet (1,000 mg) by mouth once daily. Historical Provider, MD Taking Active   cholecalciferol (Vitamin D-3) 25 MCG (1000 UT) tablet [id number] No Take 1 tablet (1,000 Units) by mouth once daily. Historical Provider, MD Taking Active   metoprolol succinate XL (Toprol-XL) 100 mg 24 hr tablet [id number] No Take 1 tablet (100 mg) by mouth once daily. Historical Provider, MD Taking Active   pravastatin (Pravachol) 40 mg tablet [id number] No Take 1 tablet (40 mg) by mouth once daily at bedtime. Historical Provider, MD Taking Active                    Allergies   Allergen Reactions    Penicillins Rash       Social History     Socioeconomic History    Marital status:      Spouse name: Not on file    Number of children: Not on file    Years of education: Not on file    Highest education level: Not on file   Occupational History    Not on file   Tobacco Use    Smoking status: Never    Smokeless tobacco: Never   Substance and Sexual Activity    Alcohol use: Yes     Comment: social    Drug use: Never    Sexual activity: Not on file   Other  Topics Concern    Not on file   Social History Narrative    Not on file     Social Drivers of Health     Financial Resource Strain: Not on file   Food Insecurity: Not on file   Transportation Needs: Not on file   Physical Activity: Not on file   Stress: Not on file   Social Connections: Not on file   Intimate Partner Violence: Not on file   Housing Stability: Not on file       No past surgical history on file.    Review of Systems    Physical Exam:  side: bilateral Knee:  General: Well-appearing female in no acute distress.  Awake, alert and oriented.  Pleasant and cooperative.  Respiratory: Non-labored breathing  Mood: Euthymic   Gait: Antalgic  Assistive Device: walker  Skin: warm, dry and intact without lesions  Tenderness to palpation over anterior and medial, Joint line.  Effusion: mild  ROM Flexion/Extension: Unchanged  No instability varus or valgus stressing the knee at 0, 30 or 60 degrees.  No instability in the AP plane at 90 degrees.  5/5 hip flexion/knee extension/DF/PF/EHL  SILT in a candido/saph/per/tib distribution  Neuro L5-S1 sensation intact bilaterally, No clonus. 2+ symmetric patella and achilles reflexes bilateral.  2+ Femoral/DP/PT pulses bilaterally  Compartments supple and non-tender.  Extremities warm and well perfused.    There were no vitals filed for this visit.  There is no height or weight on file to calculate BMI.    Impression/Plan  Chio Lopez is getting adequate relief from injection therapy.     I discussed non-operative treatments for the patients' arthritis in detail and briefly discussed indications for surgery vs conservative treatment. The patient has elected to undergo knee side: bilateral injections today.  He understands he can repeat the injections every 3 to 4 months.  Hopefully gets the relief he is looking for.    Primary Osteoarthritis side: bilateral Knee    Procedure  L Inj/Asp: bilateral knee on 11/21/2024 9:15 AM  Indications: pain and joint swelling  Details: 22  G needle, anterolateral approach  Medications (Right): 1 mL triamcinolone acetonide 40 mg/mL; 4 mL lidocaine 10 mg/mL (1 %)  Medications (Left): 1 mL triamcinolone acetonide 40 mg/mL; 4 mL lidocaine 10 mg/mL (1 %)  Outcome: tolerated well, no immediate complications    Discussion:  I discussed the conservative treatment options for knee osteoarthritis including but not limited to physical therapy, oral NSAIDS, activity and lifestyle modification, and corticosteroid injections. Pt has elected to undergo a cortisone injection today. I have explained the risk and benefits of an injection including the possibility of joint infection, bleeding, damage to cartilage, allergic reaction. Patient verbalized understanding and gave verbal consent wishes to proceed with a intra-articular cortisone injection for their knee.    Procedure:  After discussing the risk and benefits of the procedure, we proceeded with an intra-articular bilateral knee injection.    With the patient's informed verbal consent, the bilateral knees were prepped in standard sterile fashion with Chlorhexidine. The skin was then anesthetized with ethyl chloride spray and cleaned again with Chlorhexidine. The bilateral knees were then apirated/injected with a prefilled 20-gauge syringe of 40 mg Kenalog + 4 ml Lidocaine using the lateral approach without complications.  The patient tolerated this well and felt immediate initial relief of symptoms. A bandaid was applied and the patient ambulated out of the clinic on ther own accord without difficulty. Patient was instructed to avoid physical activity for 24-48 hours to prevent the knees from swelling and may ice the knees as tolerated. Patient should contact the office if any signs of of infection appear: redness, fever, chills, drainage, swelling or warmth to the knees.  Pt understands that the injections can be repeated no sooner than 3 months.      Procedure, treatment alternatives, risks and benefits  explained, specific risks discussed. Consent was given by the patient. Immediately prior to procedure a time out was called to verify the correct patient, procedure, equipment, support staff and site/side marked as required. Patient was prepped and draped in the usual sterile fashion.           All questions were answered.    Follow-up 3-4 months/PRN    GONZALO Jarquin, PAReinaC, ATC  Orthopedic Physician Assisant  Adult Reconstruction and Total Joint Replacement  General Orthopedics  Department of Orthopaedic Surgery  [place] [address]  [employer] messaging preferred

## 2024-11-29 ENCOUNTER — APPOINTMENT (OUTPATIENT)
Dept: CARDIOLOGY | Facility: CLINIC | Age: 84
End: 2024-11-29
Payer: COMMERCIAL

## 2024-12-02 ENCOUNTER — OFFICE VISIT (OUTPATIENT)
Dept: CARDIOLOGY | Facility: CLINIC | Age: 84
End: 2024-12-02
Payer: COMMERCIAL

## 2024-12-02 VITALS
HEART RATE: 65 BPM | SYSTOLIC BLOOD PRESSURE: 138 MMHG | WEIGHT: 160 LBS | OXYGEN SATURATION: 100 % | BODY MASS INDEX: 26.66 KG/M2 | DIASTOLIC BLOOD PRESSURE: 72 MMHG | HEIGHT: 65 IN

## 2024-12-02 DIAGNOSIS — I47.29 NSVT (NONSUSTAINED VENTRICULAR TACHYCARDIA) (MULTI): ICD-10-CM

## 2024-12-02 DIAGNOSIS — I35.0 MILD AORTIC STENOSIS: ICD-10-CM

## 2024-12-02 DIAGNOSIS — R01.1 SYSTOLIC MURMUR: ICD-10-CM

## 2024-12-02 DIAGNOSIS — I65.23 ATHEROSCLEROSIS OF BOTH CAROTID ARTERIES: Primary | ICD-10-CM

## 2024-12-02 DIAGNOSIS — I51.7 LEFT VENTRICULAR HYPERTROPHY: ICD-10-CM

## 2024-12-02 DIAGNOSIS — R00.2 PALPITATIONS: ICD-10-CM

## 2024-12-02 DIAGNOSIS — E78.00 PURE HYPERCHOLESTEROLEMIA: ICD-10-CM

## 2024-12-02 DIAGNOSIS — I47.10 PAROXYSMAL SVT (SUPRAVENTRICULAR TACHYCARDIA) (CMS-HCC): ICD-10-CM

## 2024-12-02 DIAGNOSIS — I10 HYPERTENSION, GOAL BELOW 150/90: ICD-10-CM

## 2024-12-02 PROCEDURE — 3078F DIAST BP <80 MM HG: CPT | Performed by: STUDENT IN AN ORGANIZED HEALTH CARE EDUCATION/TRAINING PROGRAM

## 2024-12-02 PROCEDURE — 1159F MED LIST DOCD IN RCRD: CPT | Performed by: STUDENT IN AN ORGANIZED HEALTH CARE EDUCATION/TRAINING PROGRAM

## 2024-12-02 PROCEDURE — G2211 COMPLEX E/M VISIT ADD ON: HCPCS | Performed by: STUDENT IN AN ORGANIZED HEALTH CARE EDUCATION/TRAINING PROGRAM

## 2024-12-02 PROCEDURE — 3075F SYST BP GE 130 - 139MM HG: CPT | Performed by: STUDENT IN AN ORGANIZED HEALTH CARE EDUCATION/TRAINING PROGRAM

## 2024-12-02 PROCEDURE — 1036F TOBACCO NON-USER: CPT | Performed by: STUDENT IN AN ORGANIZED HEALTH CARE EDUCATION/TRAINING PROGRAM

## 2024-12-02 PROCEDURE — 99214 OFFICE O/P EST MOD 30 MIN: CPT | Performed by: STUDENT IN AN ORGANIZED HEALTH CARE EDUCATION/TRAINING PROGRAM

## 2024-12-02 NOTE — PROGRESS NOTES
Follow-up visit    HPI:    Chio Lopez is a 84 y.o. female with pertinent history of hyperlipidemia, hypertension, osteoarthritis, palpitations with event monitor performed January 2021 revealing 362 episodes of supraventricular tachycardia lasting up to 33 beats and 12 episodes of nonsustained ventricular tachycardia lasting up to 28 beats, improvement on beta-blocker therapy, no clear ischemia nuclear stress test performed 3/1/2021, coronary calcium score of 4.8 performed 2/2/2021, preserved ejection fraction of 57% with impaired relaxation diastolic dysfunction, septal hypertrophy, mild to moderate left atrial enlargement, mild aortic stenosis on echo performed 11/15/2024 presents to cardiology clinic for follow-up.      She is accompanied by her daughter provides history and has questions.  Clinically she is doing well.  She does have some achiness from osteoarthritis.  No significant chest discomfort.  Dyspnea on exertion is stable.  We reviewed and discussed her prior echo and stress test results.  We discussed the natural history of aortic stenosis and left ventricular hypertrophy.  Blood pressure has been acceptable but frequently running in the mid to high 130s systolic.  At this point, she is only taking half of a tablet of amlodipine 5 mg or a total of 2.5 mg daily.  No exacerbating or relieving factors.  Patient denies chest pain and angina.  Pt denies orthopnea, and paroxysmal nocturnal dyspnea.  Pt denies worsening lower extremity edema.  Pt denies palpitations or syncope.  No recent falls.  No fever or chills.  No cough.  No change in bowel or bladder habits.  No sick contacts.  No recent travel.    12 point review of systems including (Constitutional, Eyes, ENMT, Respiratory, Cardiac, Gastrointestinal, Neurological, Psychiatric, and Hematologic) was performed and is otherwise negative.    Past medical history reviewed:   has a past medical history of Abnormal findings on diagnostic imaging of  other parts of musculoskeletal system (05/01/2018), Benign neoplasm of colon, unspecified, Diverticulosis of large intestine without perforation or abscess without bleeding, Elevated blood-pressure reading, without diagnosis of hypertension (10/24/2019), Encounter for screening for malignant neoplasm of cervix (08/13/2020), Encounter for screening for respiratory tuberculosis (05/01/2018), Encounter for screening, unspecified (08/13/2020), Other chondrocalcinosis, left knee, Other chondrocalcinosis, right knee, Other long term (current) drug therapy, Personal history of malignant neoplasm of breast, and Radiculopathy, thoracic region.    Past surgical history reviewed:   has no past surgical history on file.    Social history reviewed:   reports that she has never smoked. She has never used smokeless tobacco. She reports current alcohol use. She reports that she does not use drugs.     Family history reviewed:    Family History   Problem Relation Name Age of Onset    Diabetes Father      Other (cardiac disorder) Father         Allergies reviewed: Penicillins     Medications reviewed:   Current Outpatient Medications   Medication Instructions    amLODIPine (NORVASC) 5 mg, oral, Daily, for blood pressure    ascorbic acid (VITAMIN C) 1,000 mg, oral, Daily RT    cholecalciferol (VITAMIN D-3) 1,000 Units, oral, Daily RT    metoprolol succinate XL (TOPROL-XL) 100 mg, oral, Daily    pravastatin (PRAVACHOL) 40 mg, oral, Nightly        Vitals reviewed: Visit Vitals  /72   Pulse 65         Physical Exam:   General:  Patient is awake, alert, and oriented.  Patient is in no acute distress.  HEENT:  Pupils equal and reactive.  Normocephalic.  Moist mucosa.    Neck:  No thyromegaly.  Normal Jugular Venous Pressure.  Cardiovascular:  Regular rate and rhythm.  Normal S1 and S2.  1/6 FUAD.  Pulmonary:  Clear to auscultation bilaterally.  Abdomen:  Soft. Non-tender.   Non-distended.  Positive bowel sounds.  Lower Extremities:   2+ pedal pulses. No LE edema.  Neurologic:  Cranial nerves intact.  No focal deficit.   Skin: Skin warm and dry, normal skin turgor.   Psychiatric: Normal affect.    Last Labs:  CBC -      Lab Results   Component Value Date    WBC 3.8 (L) 09/22/2022    HGB 13.0 09/22/2022    HCT 37.6 09/22/2022     09/22/2022        CMP-  Lab Results   Component Value Date    GLUCOSE 112 (H) 09/22/2022     09/22/2022    K 4.4 09/22/2022     09/22/2022    CO2 32 09/22/2022    ANIONGAP 10 09/22/2022    BUN 16 09/22/2022    CREATININE 0.69 09/22/2022    CALCIUM 10.2 09/22/2022    PROT 7.3 09/22/2022    ALBUMIN 4.3 09/22/2022    AST 18 09/22/2022    ALT 9 09/22/2022    ALKPHOS 48 09/22/2022    BILITOT 0.4 09/22/2022        LIPIDS-  Lab Results   Component Value Date    CHOL 228 (H) 09/22/2022    TRIG 65 09/22/2022    HDL 76.5 09/22/2022    CHHDL 3.0 09/22/2022    VLDL 13 09/22/2022        OTHERS-  Lab Results   Component Value Date    HGBA1C 5.3 09/22/2022        I personally reviewed the patient's recent vitals, labs, medications, orders, EKGs, pertinent cardiac imaging/ echocardiography and ischemic evaluations including stress testing.    Assessment and Plan:    Chio Lopez is a 84 y.o. female with pertinent history of hyperlipidemia, hypertension, osteoarthritis, palpitations with event monitor performed January 2021 revealing 362 episodes of supraventricular tachycardia lasting up to 33 beats and 12 episodes of nonsustained ventricular tachycardia lasting up to 28 beats, improvement on beta-blocker therapy, no clear ischemia nuclear stress test performed 3/1/2021, coronary calcium score of 4.8 performed 2/2/2021, preserved ejection fraction of 57% with impaired relaxation diastolic dysfunction, septal hypertrophy, mild to moderate left atrial enlargement, mild aortic stenosis on echo performed 11/15/2024 presents to cardiology clinic for follow-up.  She is accompanied by her daughter provides history and  has questions.  Clinically she is doing well.  She does have some achiness from osteoarthritis.  No significant chest discomfort.  Dyspnea on exertion is stable.  We reviewed and discussed her prior echo and stress test results.  We discussed the natural history of aortic stenosis and left ventricular hypertrophy.  Blood pressure has been acceptable but frequently running in the mid to high 130s systolic.  At this point, she is only taking half of a tablet of amlodipine 5 mg or a total of 2.5 mg daily.     We discussed changing amlodipine to half of a tablet or 2.5 mg twice daily as opposed to once daily or you could take the full tablet all at once if that works.  Hopefully this will result in better blood pressure control    Continue amlodipine 2.5 mg twice daily, pravastatin 40 mg daily, metoprolol succinate 100 mg daily.     Please followup with me in Cardiology clinic within the next 1 year.  Please return to clinic sooner or seek emergent care if your symptoms reoccur or worsen.    Thank you for allowing me to participate in their care.  Please feel free to call me with any further questions or concerns.      Thony Elizabeth MD, [facility], [facility]  Division of Cardiovascular Medicine  System Director, Nuclear Cardiology   Medical Director, [facility] [place], [facility]   Clinical , [employer]  [email or web address]   Office:  [phone]

## 2024-12-02 NOTE — PATIENT INSTRUCTIONS
We discussed changing amlodipine to half of a tablet or 2.5 mg twice daily as opposed to once daily or you could take the full tablet all at once if that works.  Hopefully this will result in better blood pressure control    Continue amlodipine 2.5 mg twice daily, pravastatin 40 mg daily, metoprolol succinate 100 mg daily.     Please followup with me in Cardiology clinic within the next 1 year.  Please return to clinic sooner or seek emergent care if your symptoms reoccur or worsen.

## 2025-02-21 ENCOUNTER — OFFICE VISIT (OUTPATIENT)
Dept: ORTHOPEDIC SURGERY | Facility: CLINIC | Age: 85
End: 2025-02-21
Payer: COMMERCIAL

## 2025-02-21 DIAGNOSIS — M17.0 PRIMARY OSTEOARTHRITIS OF KNEES, BILATERAL: Primary | ICD-10-CM

## 2025-02-21 PROCEDURE — 2500000004 HC RX 250 GENERAL PHARMACY W/ HCPCS (ALT 636 FOR OP/ED): Performed by: PHYSICIAN ASSISTANT

## 2025-02-21 PROCEDURE — 99214 OFFICE O/P EST MOD 30 MIN: CPT | Performed by: PHYSICIAN ASSISTANT

## 2025-02-21 PROCEDURE — 20610 DRAIN/INJ JOINT/BURSA W/O US: CPT | Mod: 50 | Performed by: PHYSICIAN ASSISTANT

## 2025-02-21 RX ORDER — LIDOCAINE HYDROCHLORIDE 10 MG/ML
4 INJECTION, SOLUTION INFILTRATION; PERINEURAL
Status: COMPLETED | OUTPATIENT
Start: 2025-02-21 | End: 2025-02-21

## 2025-02-21 RX ORDER — TRIAMCINOLONE ACETONIDE 40 MG/ML
1 INJECTION, SUSPENSION INTRA-ARTICULAR; INTRAMUSCULAR
Status: COMPLETED | OUTPATIENT
Start: 2025-02-21 | End: 2025-02-21

## 2025-02-21 RX ADMIN — LIDOCAINE HYDROCHLORIDE 4 ML: 10 INJECTION, SOLUTION INFILTRATION; PERINEURAL at 09:49

## 2025-02-21 RX ADMIN — TRIAMCINOLONE ACETONIDE 1 ML: 400 INJECTION, SUSPENSION INTRA-ARTICULAR; INTRAMUSCULAR at 09:49

## 2025-02-21 ASSESSMENT — PAIN DESCRIPTION - DESCRIPTORS: DESCRIPTORS: ACHING;DISCOMFORT

## 2025-02-21 ASSESSMENT — PAIN SCALES - GENERAL: PAINLEVEL_OUTOF10: 6

## 2025-02-21 ASSESSMENT — PAIN - FUNCTIONAL ASSESSMENT: PAIN_FUNCTIONAL_ASSESSMENT: 0-10

## 2025-02-21 NOTE — PROGRESS NOTES
GONZALO Jarquin, PAReinaC, ATC  Adult Reconstruction and Joint Replacement Surgery  Phone: [phone]     Fax:[phone]            Chief Complaint   Patient presents with    Left Knee - Pain    Right Knee - Pain       HPI  Chio Lopez is a pleasant 84 y.o. year-old female here for follow-up of known underlying side: bilateral knee osteoarthritis. The patient presents for repeat evaluation.  The patient notes persistent pain as well as some occasional mechanical symptoms.  Pain level: 5. The patient is getting about 3 monthsof relief out of her injections.   The Pt has elected to undergo injection/s today.    Past Medical History:   Diagnosis Date    Abnormal findings on diagnostic imaging of other parts of musculoskeletal system 05/01/2018    Abnormal bone density screening    Benign neoplasm of colon, unspecified     Colon adenoma    Diverticulosis of large intestine without perforation or abscess without bleeding     Diverticulosis, sigmoid    Elevated blood-pressure reading, without diagnosis of hypertension 10/24/2019    Finding of above normal blood pressure    Encounter for screening for malignant neoplasm of cervix 08/13/2020    Screening for cervical cancer    Encounter for screening for respiratory tuberculosis 05/01/2018    Screening-pulmonary TB    Encounter for screening, unspecified 08/13/2020    Screening for condition    Other chondrocalcinosis, left knee     Chondrocalcinosis of left knee    Other chondrocalcinosis, right knee     Chondrocalcinosis of right knee    Other long term (current) drug therapy     On statin therapy    Personal history of malignant neoplasm of breast     History of malignant neoplasm of female breast    Radiculopathy, thoracic region     Thoracic and lumbosacral neuritis     Patient Active Problem List   Diagnosis    Abnormal CBC measurement    Atherosclerosis of both carotid arteries    Benign neoplasm of colon    Chronic constipation    History of  colon polyps    Hyperlipidemia    Hypertension, goal below 150/90    Impaired flexibility of lower extremity    Intermittent lightheadedness    Leg cramps    Low back pain    NSVT (nonsustained ventricular tachycardia) (Multi)    Osteoarthritis of knees, bilateral    Pain in limb    Palpitations    Paroxysmal SVT (supraventricular tachycardia) (CMS-HCC)    Personal history of malignant neoplasm of breast    Phlebitis    Pure hypercholesterolemia    Right calf pain    Systolic murmur    Weakness of right lower extremity    Vitamin D deficiency       Medication Documentation Review Audit       Reviewed by Katherine Dowd on 02/21/25 at 0931      Medication Order Taking? Sig Documenting Provider Last Dose Status   amLODIPine (Norvasc) 5 mg tablet [id number] No TAKE 1 TABLET BY MOUTH DAILY FOR BLOOD PRESSURE Daysi Harding, APRN-CNP Taking Active   ascorbic acid (Vitamin C) 1,000 mg tablet [id number] No Take 1 tablet (1,000 mg) by mouth once daily. Historical Provider, MD Taking Active   cholecalciferol (Vitamin D-3) 25 MCG (1000 UT) tablet [id number] No Take 1 tablet (1,000 Units) by mouth once daily. Historical Provider, MD Taking Active   metoprolol succinate XL (Toprol-XL) 100 mg 24 hr tablet [id number] No Take 1 tablet (100 mg) by mouth once daily. Historical Provider, MD Taking Active   pravastatin (Pravachol) 40 mg tablet [id number] No Take 1 tablet (40 mg) by mouth once daily at bedtime. Historical Provider, MD Taking Active                    Allergies   Allergen Reactions    Penicillins Rash       Social History     Socioeconomic History    Marital status:      Spouse name: Not on file    Number of children: Not on file    Years of education: Not on file    Highest education level: Not on file   Occupational History    Not on file   Tobacco Use    Smoking status: Never    Smokeless tobacco: Never   Substance and Sexual Activity    Alcohol use: Yes     Comment: social    Drug use: Never    Sexual activity: Not on  file   Other Topics Concern    Not on file   Social History Narrative    Not on file     Social Drivers of Health     Financial Resource Strain: Not on file   Food Insecurity: Not on file   Transportation Needs: Not on file   Physical Activity: Not on file   Stress: Not on file   Social Connections: Not on file   Intimate Partner Violence: Not on file   Housing Stability: Not on file       History reviewed. No pertinent surgical history.    Review of Systems    Physical Exam:  side: bilateral Knee:  General: Well-appearing female in no acute distress.  Awake, alert and oriented.  Pleasant and cooperative.  Respiratory: Non-labored breathing  Mood: Euthymic   Gait: Antalgic  Assistive Device: no device  Skin: warm, dry and intact without lesions  Tenderness to palpation over anterior and lateral, Joint line.  Effusion: mild  ROM Flexion/Extension: Unchanged  No instability varus or valgus stressing the knee at 0, 30 or 60 degrees.  No instability in the AP plane at 90 degrees.  5/5 hip flexion/knee extension/DF/PF/EHL  SILT in a candido/saph/per/tib distribution  Neuro L5-S1 sensation intact bilaterally, No clonus. 2+ symmetric patella and achilles reflexes bilateral.  2+ Femoral/DP/PT pulses bilaterally  Compartments supple and non-tender.  Extremities warm and well perfused.    There were no vitals filed for this visit.  There is no height or weight on file to calculate BMI.    Impression/Plan  Chio Lopez is getting adequate relief from injection therapy.     I discussed non-operative treatments for the patients' arthritis in detail and briefly discussed indications for surgery vs conservative treatment. The patient has elected to undergo knee side: bilateral injections today. They can repeat the injections every 3 to 4 months.  Hopefully gets the relief they are looking for.    Primary Osteoarthritis side: bilateral Knee    Procedure  L Inj/Asp: bilateral knee on 2/21/2025 9:49 AM  Indications: pain and joint  swelling  Details: 22 G needle, anterolateral approach  Medications (Right): 1 mL triamcinolone acetonide 40 mg/mL; 4 mL lidocaine 10 mg/mL (1 %)  Medications (Left): 1 mL triamcinolone acetonide 40 mg/mL; 4 mL lidocaine 10 mg/mL (1 %)  Outcome: tolerated well, no immediate complications    Discussion:  I discussed the conservative treatment options for knee osteoarthritis including but not limited to physical therapy, oral NSAIDS, activity and lifestyle modification, and corticosteroid injections. Pt has elected to undergo a cortisone injection today. I have explained the risk and benefits of an injection including the possibility of joint infection, bleeding, damage to cartilage, allergic reaction. Patient verbalized understanding and gave verbal consent wishes to proceed with a intra-articular cortisone injection for their knee.    Procedure:  After discussing the risk and benefits of the procedure, we proceeded with an intra-articular bilateral knee injection.    With the patient's informed verbal consent, the bilateral knees were prepped in standard sterile fashion with Chlorhexidine. The skin was then anesthetized with ethyl chloride spray and cleaned again with Chlorhexidine. The bilateral knees were then apirated/injected with a prefilled 20-gauge syringe of 40 mg Kenalog + 4 ml Lidocaine using the lateral approach without complications.  The patient tolerated this well and felt immediate initial relief of symptoms. A bandaid was applied and the patient ambulated out of the clinic on ther own accord without difficulty. Patient was instructed to avoid physical activity for 24-48 hours to prevent the knees from swelling and may ice the knees as tolerated. Patient should contact the office if any signs of of infection appear: redness, fever, chills, drainage, swelling or warmth to the knees.  Pt understands that the injections can be repeated no sooner than 3 months.      Procedure, treatment alternatives,  risks and benefits explained, specific risks discussed. Consent was given by the patient. Immediately prior to procedure a time out was called to verify the correct patient, procedure, equipment, support staff and site/side marked as required. Patient was prepped and draped in the usual sterile fashion.           All questions were answered.    Follow-up 3-4 months/PRN    GONZALO Jarquin, PA-C, ATC  Orthopedic Physician Assisant  Adult Reconstruction and Total Joint Replacement  General Orthopedics  Department of Orthopaedic Surgery  [place] [phone]  [employer] messaging preferred

## 2025-05-23 ENCOUNTER — APPOINTMENT (OUTPATIENT)
Dept: ORTHOPEDIC SURGERY | Facility: CLINIC | Age: 85
End: 2025-05-23
Payer: COMMERCIAL

## 2025-05-23 DIAGNOSIS — M17.0 PRIMARY OSTEOARTHRITIS OF KNEES, BILATERAL: Primary | ICD-10-CM

## 2025-05-23 PROCEDURE — 99214 OFFICE O/P EST MOD 30 MIN: CPT | Mod: 25 | Performed by: PHYSICIAN ASSISTANT

## 2025-05-23 PROCEDURE — 99214 OFFICE O/P EST MOD 30 MIN: CPT | Performed by: PHYSICIAN ASSISTANT

## 2025-05-23 PROCEDURE — 2500000004 HC RX 250 GENERAL PHARMACY W/ HCPCS (ALT 636 FOR OP/ED): Performed by: PHYSICIAN ASSISTANT

## 2025-05-23 PROCEDURE — 1159F MED LIST DOCD IN RCRD: CPT | Performed by: PHYSICIAN ASSISTANT

## 2025-05-23 PROCEDURE — 20610 DRAIN/INJ JOINT/BURSA W/O US: CPT | Mod: 50 | Performed by: PHYSICIAN ASSISTANT

## 2025-05-23 RX ORDER — TRIAMCINOLONE ACETONIDE 40 MG/ML
1 INJECTION, SUSPENSION INTRA-ARTICULAR; INTRAMUSCULAR
Status: COMPLETED | OUTPATIENT
Start: 2025-05-23 | End: 2025-05-23

## 2025-05-23 RX ORDER — LIDOCAINE HYDROCHLORIDE 10 MG/ML
4 INJECTION, SOLUTION INFILTRATION; PERINEURAL
Status: COMPLETED | OUTPATIENT
Start: 2025-05-23 | End: 2025-05-23

## 2025-05-23 RX ADMIN — LIDOCAINE HYDROCHLORIDE 4 ML: 10 INJECTION, SOLUTION INFILTRATION; PERINEURAL at 09:35

## 2025-05-23 RX ADMIN — TRIAMCINOLONE ACETONIDE 1 ML: 400 INJECTION, SUSPENSION INTRA-ARTICULAR; INTRAMUSCULAR at 09:35

## 2025-05-23 ASSESSMENT — PAIN - FUNCTIONAL ASSESSMENT: PAIN_FUNCTIONAL_ASSESSMENT: 0-10

## 2025-05-23 NOTE — PROGRESS NOTES
GONZALO Jarquin, PA-C, ATC  Adult Reconstruction and Joint Replacement Surgery  Phone: [phone]     Fax:[phone]            Chief Complaint   Patient presents with    Left Knee - Injections    Right Knee - Injections       HPI  Chio Lopez is a pleasant 84 y.o. year-old female here for follow-up of known underlying side: bilateral knee osteoarthritis. The patient presents for repeat evaluation.  The patient notes persistent pain as well as some occasional mechanical symptoms.  Pain level: 4. The patient is getting about 3 monthsof relief out of her injections.   The Pt has elected to undergo injection/s today.    Medical History[1]  Problem List[2]    Medication Documentation Review Audit       Reviewed by Oseas Kulkarni MA (Medical Assistant) on 05/23/25 at 0924      Medication Order Taking? Sig Documenting Provider Last Dose Status   amLODIPine (Norvasc) 5 mg tablet [id number] No TAKE 1 TABLET BY MOUTH DAILY FOR BLOOD PRESSURE Daysi Harding, APRN-CNP Taking Active   ascorbic acid (Vitamin C) 1,000 mg tablet [id number] No Take 1 tablet (1,000 mg) by mouth once daily. Historical Provider, MD Taking Active   cholecalciferol (Vitamin D-3) 25 MCG (1000 UT) tablet [id number] No Take 1 tablet (1,000 Units) by mouth once daily. Historical Provider, MD Taking Active   metoprolol succinate XL (Toprol-XL) 100 mg 24 hr tablet [id number] No Take 1 tablet (100 mg) by mouth once daily. Historical Provider, MD Taking Active   pravastatin (Pravachol) 40 mg tablet [id number] No Take 1 tablet (40 mg) by mouth once daily at bedtime. Historical Provider, MD Taking Active                    RX Allergies[3]    Social History     Socioeconomic History    Marital status:      Spouse name: Not on file    Number of children: Not on file    Years of education: Not on file    Highest education level: Not on file   Occupational History    Not on file   Tobacco Use    Smoking status: Never    Smokeless tobacco:  Never   Substance and Sexual Activity    Alcohol use: Yes     Comment: social    Drug use: Never    Sexual activity: Not on file   Other Topics Concern    Not on file   Social History Narrative    Not on file     Social Drivers of Health     Financial Resource Strain: Not on file   Food Insecurity: Not on file   Transportation Needs: Not on file   Physical Activity: Not on file   Stress: Not on file   Social Connections: Not on file   Intimate Partner Violence: Not on file   Housing Stability: Not on file       Surgical History[4]    Review of Systems    Physical Exam:  side: bilateral Knee:  General: Well-appearing female in no acute distress.  Awake, alert and oriented.  Pleasant and cooperative.  Respiratory: Non-labored breathing  Mood: Euthymic   Gait: Antalgic  Assistive Device: no device  Skin: warm, dry and intact without lesions  Tenderness to palpation over anterior and medial, Joint line.  Effusion: mild  ROM Flexion/Extension: Unchanged  No instability varus or valgus stressing the knee at 0, 30 or 60 degrees.  No instability in the AP plane at 90 degrees.  5/5 hip flexion/knee extension/DF/PF/EHL  SILT in a candido/saph/per/tib distribution  Neuro L5-S1 sensation intact bilaterally, No clonus. 2+ symmetric patella and achilles reflexes bilateral.  2+ Femoral/DP/PT pulses bilaterally  Compartments supple and non-tender.  Extremities warm and well perfused.    There were no vitals filed for this visit.  There is no height or weight on file to calculate BMI.    Impression/Plan  Chio Beltreman is getting adequate relief from injection therapy.     I discussed non-operative treatments for the patients' arthritis in detail and briefly discussed indications for surgery vs conservative treatment. The patient has elected to undergo knee side: bilateral injections today. They can repeat the injections every 3 to 4 months.  Hopefully gets the relief they are looking for.    Primary Osteoarthritis side: bilateral  Knee    Procedure  L Inj/Asp: bilateral knee on 5/23/2025 9:35 AM  Indications: pain and joint swelling  Details: 22 G needle, anterolateral approach  Medications (Right): 1 mL triamcinolone acetonide 40 mg/mL; 4 mL lidocaine 10 mg/mL (1 %)  Medications (Left): 1 mL triamcinolone acetonide 40 mg/mL; 4 mL lidocaine 10 mg/mL (1 %)  Outcome: tolerated well, no immediate complications    Discussion:  I discussed the conservative treatment options for knee osteoarthritis including but not limited to physical therapy, oral NSAIDS, activity and lifestyle modification, and corticosteroid injections. Pt has elected to undergo a cortisone injection today. I have explained the risk and benefits of an injection including the possibility of joint infection, bleeding, damage to cartilage, allergic reaction. Patient verbalized understanding and gave verbal consent wishes to proceed with a intra-articular cortisone injection for their knee.    Procedure:  After discussing the risk and benefits of the procedure, we proceeded with an intra-articular bilateral knee injection.    With the patient's informed verbal consent, the bilateral knees were prepped in standard sterile fashion with Chlorhexidine. The skin was then anesthetized with ethyl chloride spray and cleaned again with Chlorhexidine. The bilateral knees were then apirated/injected with a prefilled 20-gauge syringe of 40 mg Kenalog + 4 ml Lidocaine using the lateral approach without complications.  The patient tolerated this well and felt immediate initial relief of symptoms. A bandaid was applied and the patient ambulated out of the clinic on ther own accord without difficulty. Patient was instructed to avoid physical activity for 24-48 hours to prevent the knees from swelling and may ice the knees as tolerated. Patient should contact the office if any signs of of infection appear: redness, fever, chills, drainage, swelling or warmth to the knees.  Pt understands that the  injections can be repeated no sooner than 3 months.      Procedure, treatment alternatives, risks and benefits explained, specific risks discussed. Consent was given by the patient. Immediately prior to procedure a time out was called to verify the correct patient, procedure, equipment, support staff and site/side marked as required. Patient was prepped and draped in the usual sterile fashion.           All questions were answered.    Follow-up 3-4 months/PRN    GONZALO Jarquin, PAReinaC, ATC  Orthopedic Physician Assisant  Adult Reconstruction and Total Joint Replacement  General Orthopedics  Department of Orthopaedic Surgery  [place] [address]  ebindle messaging preferred         [1]   Past Medical History:  Diagnosis Date    Abnormal findings on diagnostic imaging of other parts of musculoskeletal system 05/01/2018    Abnormal bone density screening    Benign neoplasm of colon, unspecified     Colon adenoma    Diverticulosis of large intestine without perforation or abscess without bleeding     Diverticulosis, sigmoid    Elevated blood-pressure reading, without diagnosis of hypertension 10/24/2019    Finding of above normal blood pressure    Encounter for screening for malignant neoplasm of cervix 08/13/2020    Screening for cervical cancer    Encounter for screening for respiratory tuberculosis 05/01/2018    Screening-pulmonary TB    Encounter for screening, unspecified 08/13/2020    Screening for condition    Other chondrocalcinosis, left knee     Chondrocalcinosis of left knee    Other chondrocalcinosis, right knee     Chondrocalcinosis of right knee    Other long term (current) drug therapy     On statin therapy    Personal history of malignant neoplasm of breast     History of malignant neoplasm of female breast    Radiculopathy, thoracic region     Thoracic and lumbosacral neuritis   [2]   Patient Active Problem List  Diagnosis     Abnormal CBC measurement    Atherosclerosis of both carotid arteries    Benign neoplasm of colon    Chronic constipation    History of colon polyps    Hyperlipidemia    Hypertension, goal below 150/90    Impaired flexibility of lower extremity    Intermittent lightheadedness    Leg cramps    Low back pain    NSVT (nonsustained ventricular tachycardia) (Multi)    Osteoarthritis of knees, bilateral    Pain in limb    Palpitations    Paroxysmal SVT (supraventricular tachycardia)    Personal history of malignant neoplasm of breast    Phlebitis    Pure hypercholesterolemia    Right calf pain    Systolic murmur    Weakness of right lower extremity    Vitamin D deficiency   [3]   Allergies  Allergen Reactions    Penicillins Rash   [4] No past surgical history on file.

## 2025-06-27 ENCOUNTER — EVALUATION (OUTPATIENT)
Dept: PHYSICAL THERAPY | Facility: CLINIC | Age: 85
End: 2025-06-27
Payer: COMMERCIAL

## 2025-06-27 DIAGNOSIS — M17.0 PRIMARY OSTEOARTHRITIS OF KNEES, BILATERAL: ICD-10-CM

## 2025-06-27 PROCEDURE — 97161 PT EVAL LOW COMPLEX 20 MIN: CPT | Mod: GP | Performed by: PHYSICAL THERAPIST

## 2025-06-27 PROCEDURE — 97110 THERAPEUTIC EXERCISES: CPT | Mod: GP | Performed by: PHYSICAL THERAPIST

## 2025-06-27 ASSESSMENT — ENCOUNTER SYMPTOMS
OCCASIONAL FEELINGS OF UNSTEADINESS: 0
LOSS OF SENSATION IN FEET: 0
DEPRESSION: 0

## 2025-06-27 NOTE — PROGRESS NOTES
Physical Therapy Evaluation and Treatment    Patient Name: Chio Lopez  MRN: [id number]  Today's Date: 6/30/2025  Visit #1  Payor: [employer] / Plan: [employer] / Product Type: *No Product type* /     Time Calculation  Start Time: 1300  Stop Time: 1335  Time Calculation (min): 35 min  Reason for Referral: Knee Pain  Referred By: Rohini Dubois PA-C  Preferred Learning Style: verbal, visual  PT Evaluation Time Entry  PT Evaluation (Low) Time Entry: 20  PT Therapeutic Procedures Time Entry  Therapeutic Exercise Time Entry: 15          Current Problem  Problem List Items Addressed This Visit    None  Visit Diagnoses         Codes      Primary osteoarthritis of knees, bilateral     M17.0            Assessment:  Patient presents to the clinic for bilateral knee pain.  Patient demonstrates proper gait mechanics and adequate strength and ROM.  Patient does not have any pain or difficulty doing her daily activities.  Patient was given an HEP to maintain her strength and ROM.  Patient is discharged from PT at this time.     Plan:   OP PT Plan  Treatment/Interventions: Education/ Instruction, Therapeutic exercises  PT Plan: No Additional PT interventions required at this time  PT Frequency: 1 time per week  Duration: 1 visit  Onset Date: 05/23/25  Rehab Potential: Excellent  Plan of Care Agreement: Patient     General  Reason for Referral: Knee Pain  Referred By: Rohini Dubois PA-C  Preferred Learning Style: verbal, visual  Referred by: Rohini Dubois    Precautions  Precautions  STEADI Fall Risk Score (The score of 4 or more indicates an increased risk of falling): 1  Precautions Comment: none       HPI: Patient presents to the clinic for B knee pain.  Patient notes that she has been getting injections into the knees every 3 months.  Patient notes that pain comes back about a week before the injections.  Patient denies any difficulty doing her daily activities.       Prior or current Tx: cortisone  injections and tylenol.  Patient notes that she uses anti-inflammatory creams.      PMH: none    Pain:  Patient rates pain at 0/10 at its best and 8/10 at its worse. Patient denies pain currently and she notes not having pain since her injection a couple weeks ago.        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     Posture: slight flexion of bilateral knees in standing    Palpation: Tenderness along the lateral joint line    Gait: normal gait.    Stairs: reciprocally pattern with 2 railings for balance      Lower Extremity Strength:  Date: eval RIGHT LEFT   Hip Flexion        Hip Extension     Hip Abduction     Hip Adduction     Knee Extension 5/5 5/5   Knee Flexion 5/5 5/5   Ankle DF     Ankle PF     Ankle INV     Ankle EV       Lower Extremity ROM:  Date: eval RIGHT LEFT   Hip Flexion     Hip Extension     Hip IR     Hip ER     Knee Extension -5 -5   Knee Flexion 122 128   Ankle DF     Ankle PF     Ankle INV     Ankle EV       Sensation: Intact light touch     Treatment:  Therapeutic Exercises x 15 minutes  - LAQ 2 x 10   - Hamstring Curl 2 x 10   -Heel Raises 2 x 10   - Standing Hip Abduction 2 x 10     HEP:  Access Code: [email or web address]  URL: [email or web address]  Date: 06/27/2025  Prepared by: Estrella Tobias    Exercises  - Seated Long Arc Quad  - 1 x daily - 7 x weekly - 2 sets - 10 reps  - Standing Knee Flexion  - 1 x daily - 7 x weekly - 2 sets - 10 reps  - Heel Raises with Counter Support  - 1 x daily - 7 x weekly - 2 sets - 10 reps  - Standing Hip Abduction with Counter Support  - 1 x daily - 7 x weekly - 2 sets - 10 reps    Goals:   Resolved       PT Problem       HEP (Adequate for Discharge)       Start:  06/30/25    Expected End:   06/30/25    Resolved:  06/30/25    Patient will be independent with HEP and management of condition.

## 2025-08-22 ENCOUNTER — APPOINTMENT (OUTPATIENT)
Dept: ORTHOPEDIC SURGERY | Facility: CLINIC | Age: 85
End: 2025-08-22
Payer: COMMERCIAL

## 2025-08-28 ENCOUNTER — OFFICE VISIT (OUTPATIENT)
Dept: ORTHOPEDIC SURGERY | Facility: CLINIC | Age: 85
End: 2025-08-28
Payer: COMMERCIAL

## 2025-08-28 DIAGNOSIS — M17.0 PRIMARY OSTEOARTHRITIS OF KNEES, BILATERAL: Primary | ICD-10-CM

## 2025-08-28 PROCEDURE — 2500000004 HC RX 250 GENERAL PHARMACY W/ HCPCS (ALT 636 FOR OP/ED): Performed by: PHYSICIAN ASSISTANT

## 2025-08-28 PROCEDURE — 20610 DRAIN/INJ JOINT/BURSA W/O US: CPT | Mod: 50 | Performed by: PHYSICIAN ASSISTANT

## 2025-08-28 PROCEDURE — 99214 OFFICE O/P EST MOD 30 MIN: CPT | Performed by: PHYSICIAN ASSISTANT

## 2025-08-28 RX ORDER — TRIAMCINOLONE ACETONIDE 40 MG/ML
1 INJECTION, SUSPENSION INTRA-ARTICULAR; INTRAMUSCULAR
Status: COMPLETED | OUTPATIENT
Start: 2025-08-28 | End: 2025-08-28

## 2025-08-28 RX ORDER — LIDOCAINE HYDROCHLORIDE 10 MG/ML
4 INJECTION, SOLUTION INFILTRATION; PERINEURAL
Status: COMPLETED | OUTPATIENT
Start: 2025-08-28 | End: 2025-08-28

## 2025-08-28 RX ADMIN — LIDOCAINE HYDROCHLORIDE 4 ML: 10 INJECTION, SOLUTION INFILTRATION; PERINEURAL at 08:16

## 2025-08-28 RX ADMIN — TRIAMCINOLONE ACETONIDE 1 ML: 400 INJECTION, SUSPENSION INTRA-ARTICULAR; INTRAMUSCULAR at 08:16

## 2025-12-02 ENCOUNTER — APPOINTMENT (OUTPATIENT)
Dept: CARDIOLOGY | Facility: CLINIC | Age: 85
End: 2025-12-02
Payer: COMMERCIAL